# Patient Record
Sex: MALE | Race: OTHER | Employment: UNEMPLOYED | URBAN - METROPOLITAN AREA
[De-identification: names, ages, dates, MRNs, and addresses within clinical notes are randomized per-mention and may not be internally consistent; named-entity substitution may affect disease eponyms.]

---

## 2024-08-22 ENCOUNTER — HOSPITAL ENCOUNTER (EMERGENCY)
Facility: HOSPITAL | Age: 34
Discharge: HOME | End: 2024-08-22
Attending: EMERGENCY MEDICINE

## 2024-08-22 VITALS
HEART RATE: 64 BPM | WEIGHT: 185.19 LBS | DIASTOLIC BLOOD PRESSURE: 73 MMHG | SYSTOLIC BLOOD PRESSURE: 131 MMHG | OXYGEN SATURATION: 99 % | BODY MASS INDEX: 31.79 KG/M2 | TEMPERATURE: 98.3 F | RESPIRATION RATE: 16 BRPM

## 2024-08-22 DIAGNOSIS — J45.21 MILD INTERMITTENT ASTHMA WITH EXACERBATION: Primary | ICD-10-CM

## 2024-08-22 DIAGNOSIS — J45.909 ASTHMA: ICD-10-CM

## 2024-08-22 PROCEDURE — 99283 EMERGENCY DEPT VISIT LOW MDM: CPT

## 2024-08-22 PROCEDURE — 99284 EMERGENCY DEPT VISIT MOD MDM: CPT | Performed by: EMERGENCY MEDICINE

## 2024-08-22 RX ORDER — ALBUTEROL SULFATE 90 UG/1
2 AEROSOL, METERED RESPIRATORY (INHALATION) ONCE
Status: COMPLETED | OUTPATIENT
Start: 2024-08-22 | End: 2024-08-22

## 2024-08-22 RX ORDER — ALBUTEROL SULFATE 90 UG/1
2 AEROSOL, METERED RESPIRATORY (INHALATION) EVERY 6 HOURS PRN
Qty: 8.5 G | Refills: 0 | Status: SHIPPED | OUTPATIENT
Start: 2024-08-22

## 2024-08-22 RX ADMIN — ALBUTEROL SULFATE 2 PUFF: 90 AEROSOL, METERED RESPIRATORY (INHALATION) at 10:56

## 2024-08-22 NOTE — ED PROVIDER NOTES
History  Chief Complaint   Patient presents with    Asthma     Ran out of albuterol inhaler this AM and feels tight.     34-year-old male presenting to the emerged department with wheezing and chest tightness.  No chest pain.  No fever chills.  No cough congestion rhinorrhea.        Prior to Admission Medications   Prescriptions Last Dose Informant Patient Reported? Taking?   LORazepam (Ativan) 1 mg tablet   No No   Sig: Take 1 tablet (1 mg total) by mouth every 6 (six) hours as needed for anxiety   albuterol (2.5 mg/3 mL) 0.083 % nebulizer solution   No No   Sig: Take 3 mL (2.5 mg total) by nebulization every 6 (six) hours as needed for wheezing or shortness of breath   albuterol (2.5 mg/3 mL) 0.083 % nebulizer solution   No No   Sig: Take 3 mL (2.5 mg total) by nebulization every 6 (six) hours as needed for wheezing or shortness of breath   albuterol (PROVENTIL HFA,VENTOLIN HFA) 90 mcg/act inhaler   No No   Sig: Inhale 2 puffs every 4 (four) hours as needed for wheezing or shortness of breath   albuterol (ProAir HFA) 90 mcg/act inhaler   No No   Sig: Inhale 2 puffs every 6 (six) hours as needed for wheezing   albuterol (ProAir HFA) 90 mcg/act inhaler   No No   Sig: Inhale 2 puffs every 6 (six) hours as needed for wheezing   albuterol (ProAir HFA) 90 mcg/act inhaler   No Yes   Sig: Inhale 2 puffs every 6 (six) hours as needed for wheezing   albuterol (Ventolin HFA) 90 mcg/act inhaler   No No   Sig: Inhale 1-2 puffs every 6 (six) hours as needed for wheezing   benzonatate (TESSALON PERLES) 100 mg capsule   No No   Sig: Take 1 capsule (100 mg total) by mouth 3 (three) times a day as needed for cough   budesonide-formoterol (Symbicort) 160-4.5 mcg/act inhaler   No No   Sig: Inhale 2 puffs 2 (two) times a day Rinse mouth after use.   Patient not taking: Reported on 4/3/2024   guaiFENesin (ROBITUSSIN) 100 MG/5ML oral liquid   No No   Sig: Take 5-10 mL (100-200 mg total) by mouth every 4 (four) hours as needed for cough    hydrOXYzine HCL (ATARAX) 25 mg tablet   No No   Sig: Take 1 tablet (25 mg total) by mouth every 6 (six) hours as needed for itching or anxiety   Patient not taking: Reported on 9/2/2023   pantoprazole (PROTONIX) 40 mg tablet   No No   Sig: Take 1 tablet (40 mg total) by mouth daily   Patient not taking: Reported on 4/3/2024   predniSONE 20 mg tablet   No No   Sig: Take 3 tablets (60 mg total) by mouth daily   sertraline (ZOLOFT) 50 mg tablet   No No   Sig: Take 1 tablet (50 mg total) by mouth daily   Patient not taking: Reported on 9/2/2023      Facility-Administered Medications Last Administration Doses Remaining   ipratropium (ATROVENT) 0.02 % inhalation solution 0.5 mg 9/14/2021  7:29 PM           Past Medical History:   Diagnosis Date    Anxiety     Asthma        Past Surgical History:   Procedure Laterality Date    APPENDECTOMY      FACIAL RECONSTRUCTION SURGERY      HERNIA REPAIR         History reviewed. No pertinent family history.  I have reviewed and agree with the history as documented.    E-Cigarette/Vaping    E-Cigarette Use Never User      E-Cigarette/Vaping Substances    Nicotine No     THC No     CBD No     Flavoring No     Other No     Unknown No      Social History     Tobacco Use    Smoking status: Former     Passive exposure: Never    Smokeless tobacco: Never   Vaping Use    Vaping status: Never Used   Substance Use Topics    Alcohol use: Yes     Comment: occ    Drug use: Yes     Types: Marijuana       Review of Systems   Constitutional:  Negative for chills and fever.   HENT:  Negative for ear pain and sore throat.    Eyes:  Negative for pain and visual disturbance.   Respiratory:  Positive for chest tightness and wheezing. Negative for cough and shortness of breath.    Cardiovascular:  Negative for chest pain and palpitations.   Gastrointestinal:  Negative for abdominal pain and vomiting.   Genitourinary:  Negative for dysuria and hematuria.   Musculoskeletal:  Negative for arthralgias and  back pain.   Skin:  Negative for color change and rash.   Neurological:  Negative for seizures and syncope.   All other systems reviewed and are negative.      Physical Exam  Physical Exam  Vitals and nursing note reviewed.   Constitutional:       General: He is not in acute distress.     Appearance: He is well-developed.   HENT:      Head: Normocephalic and atraumatic.   Eyes:      Conjunctiva/sclera: Conjunctivae normal.   Cardiovascular:      Rate and Rhythm: Normal rate and regular rhythm.      Heart sounds: No murmur heard.  Pulmonary:      Effort: Pulmonary effort is normal. No respiratory distress.      Breath sounds: Wheezing present.   Abdominal:      Palpations: Abdomen is soft.      Tenderness: There is no abdominal tenderness.   Musculoskeletal:         General: No swelling.      Cervical back: Neck supple.   Skin:     General: Skin is warm and dry.      Capillary Refill: Capillary refill takes less than 2 seconds.   Neurological:      Mental Status: He is alert.   Psychiatric:         Mood and Affect: Mood normal.         Vital Signs  ED Triage Vitals [08/22/24 1033]   Temperature Pulse Respirations Blood Pressure SpO2   98.3 °F (36.8 °C) 64 16 131/73 99 %      Temp Source Heart Rate Source Patient Position - Orthostatic VS BP Location FiO2 (%)   Oral Monitor Sitting Left arm --      Pain Score       --           Vitals:    08/22/24 1033   BP: 131/73   Pulse: 64   Patient Position - Orthostatic VS: Sitting         Visual Acuity      ED Medications  Medications   albuterol (PROVENTIL HFA,VENTOLIN HFA) inhaler 2 puff (2 puffs Inhalation Given 8/22/24 1056)       Diagnostic Studies  Results Reviewed       None                   No orders to display              Procedures  Procedures         ED Course                                 SBIRT 20yo+      Flowsheet Row Most Recent Value   Initial Alcohol Screen: US AUDIT-C     1. How often do you have a drink containing alcohol? 0 Filed at: 08/22/2024 1037   2.  How many drinks containing alcohol do you have on a typical day you are drinking?  0 Filed at: 08/22/2024 1037   3a. Male UNDER 65: How often do you have five or more drinks on one occasion? 0 Filed at: 08/22/2024 1037   3b. FEMALE Any Age, or MALE 65+: How often do you have 4 or more drinks on one occassion? 0 Filed at: 08/22/2024 1037   Audit-C Score 0 Filed at: 08/22/2024 1037   JEREMIAH: How many times in the past year have you...    Used an illegal drug or used a prescription medication for non-medical reasons? Never Filed at: 08/22/2024 1037                      Medical Decision Making  34-year-old male with asthma, here with wheezing. Resolved with albuterol.     Risk  Prescription drug management.                 Disposition  Final diagnoses:   Mild intermittent asthma with exacerbation     Time reflects when diagnosis was documented in both MDM as applicable and the Disposition within this note       Time User Action Codes Description Comment    8/22/2024 10:59 AM Katie Montoya [J45.21] Mild intermittent asthma with exacerbation     8/22/2024 10:59 AM Katie Montoya [J45.909] Asthma           ED Disposition       ED Disposition   Discharge    Condition   Stable    Date/Time   Thu Aug 22, 2024 1059    Comment   Dileep Luis discharge to home/self care.                   Follow-up Information       Follow up With Specialties Details Why Contact Info    Sotero Domingo MD Family Medicine   [address] [place] [address]  [phone]              Discharge Medication List as of 8/22/2024 10:59 AM        CONTINUE these medications which have CHANGED    Details   !! albuterol (ProAir HFA) 90 mcg/act inhaler Inhale 2 puffs every 6 (six) hours as needed for wheezing, Starting Thu 8/22/2024, Normal       !! - Potential duplicate medications found. Please discuss with provider.        CONTINUE these medications which have NOT CHANGED    Details   !! albuterol (2.5 mg/3 mL)  0.083 % nebulizer solution Take 3 mL (2.5 mg total) by nebulization every 6 (six) hours as needed for wheezing or shortness of breath, Starting Tue 2/6/2024, Normal      !! albuterol (2.5 mg/3 mL) 0.083 % nebulizer solution Take 3 mL (2.5 mg total) by nebulization every 6 (six) hours as needed for wheezing or shortness of breath, Starting Fri 7/5/2024, Normal      !! albuterol (ProAir HFA) 90 mcg/act inhaler Inhale 2 puffs every 6 (six) hours as needed for wheezing, Starting Fri 7/5/2024, Normal      !! albuterol (PROVENTIL HFA,VENTOLIN HFA) 90 mcg/act inhaler Inhale 2 puffs every 4 (four) hours as needed for wheezing or shortness of breath, Starting Tue 6/11/2024, Until Wed 6/11/2025 at 2359, Normal      !! albuterol (Ventolin HFA) 90 mcg/act inhaler Inhale 1-2 puffs every 6 (six) hours as needed for wheezing, Starting Wed 4/3/2024, Normal      benzonatate (TESSALON PERLES) 100 mg capsule Take 1 capsule (100 mg total) by mouth 3 (three) times a day as needed for cough, Starting Wed 1/3/2024, Normal      budesonide-formoterol (Symbicort) 160-4.5 mcg/act inhaler Inhale 2 puffs 2 (two) times a day Rinse mouth after use., Starting Tue 2/6/2024, Normal      guaiFENesin (ROBITUSSIN) 100 MG/5ML oral liquid Take 5-10 mL (100-200 mg total) by mouth every 4 (four) hours as needed for cough, Starting Wed 1/3/2024, Normal      hydrOXYzine HCL (ATARAX) 25 mg tablet Take 1 tablet (25 mg total) by mouth every 6 (six) hours as needed for itching or anxiety, Starting Wed 6/7/2023, Normal      LORazepam (Ativan) 1 mg tablet Take 1 tablet (1 mg total) by mouth every 6 (six) hours as needed for anxiety, Starting Tue 9/19/2023, Normal      pantoprazole (PROTONIX) 40 mg tablet Take 1 tablet (40 mg total) by mouth daily, Starting Tue 12/5/2023, Normal      predniSONE 20 mg tablet Take 3 tablets (60 mg total) by mouth daily, Starting Wed 4/3/2024, Normal      sertraline (ZOLOFT) 50 mg tablet Take 1 tablet (50 mg total) by mouth daily,  Starting Mon 6/12/2023, Until Sat 12/9/2023, Normal       !! - Potential duplicate medications found. Please discuss with provider.          No discharge procedures on file.    PDMP Review       None            ED Provider  Electronically Signed by             Katie Montoya MD  08/22/24 5132

## 2024-10-04 ENCOUNTER — HOSPITAL ENCOUNTER (EMERGENCY)
Facility: HOSPITAL | Age: 34
Discharge: HOME | End: 2024-10-04
Attending: EMERGENCY MEDICINE

## 2024-10-04 VITALS
BODY MASS INDEX: 31.1 KG/M2 | WEIGHT: 181.2 LBS | RESPIRATION RATE: 20 BRPM | TEMPERATURE: 97.7 F | SYSTOLIC BLOOD PRESSURE: 142 MMHG | OXYGEN SATURATION: 100 % | DIASTOLIC BLOOD PRESSURE: 74 MMHG | HEART RATE: 80 BPM

## 2024-10-04 DIAGNOSIS — Z76.0 MEDICATION REFILL: Primary | ICD-10-CM

## 2024-10-04 DIAGNOSIS — Z76.0 ENCOUNTER FOR MEDICATION REFILL: ICD-10-CM

## 2024-10-04 DIAGNOSIS — J45.901 ASTHMA EXACERBATION, MILD: ICD-10-CM

## 2024-10-04 PROCEDURE — 99285 EMERGENCY DEPT VISIT HI MDM: CPT | Performed by: EMERGENCY MEDICINE

## 2024-10-04 PROCEDURE — 99281 EMR DPT VST MAYX REQ PHY/QHP: CPT

## 2024-10-04 RX ORDER — ALBUTEROL SULFATE 90 UG/1
2 INHALANT RESPIRATORY (INHALATION) ONCE
Status: COMPLETED | OUTPATIENT
Start: 2024-10-04 | End: 2024-10-04

## 2024-10-04 RX ORDER — ALBUTEROL SULFATE 90 UG/1
2 INHALANT RESPIRATORY (INHALATION) EVERY 6 HOURS PRN
Qty: 8.5 G | Refills: 3 | Status: SHIPPED | OUTPATIENT
Start: 2024-10-04

## 2024-10-04 RX ADMIN — ALBUTEROL SULFATE 2 PUFF: 90 AEROSOL, METERED RESPIRATORY (INHALATION) at 12:10

## 2024-10-04 NOTE — ED PROVIDER NOTES
Final diagnoses:   Medication refill   Asthma exacerbation, mild     ED Disposition       ED Disposition   Discharge    Condition   Stable    Date/Time   Fri Oct 4, 2024 12:02 PM    Comment   Falk Carmelo Sauerkins discharge to home/self care.                   Assessment & Plan       Medical Decision Making    MDM/DDx: Chest tightness/wheezes - asthma flare, bronchitis/bronchospasm, less likely but at risk for pneumonia, pneumothorax, ACS/MI or PE.     I independently reviewed and interpreted ordered labs from this encounter.    A/P: Will treat symptoms with MCKNIGHT neb, Mag and steroids, reevaluate for disposition.    Risk  Prescription drug management.             Medications   albuterol (PROVENTIL HFA,VENTOLIN HFA) inhaler 2 puff (2 puffs Inhalation Given 10/4/24 1210)       ED Risk Strat Scores                                               History of Present Illness       Chief Complaint   Patient presents with    Medication Refill     Reports he is just here for a medication refill on his inhaler - albuterol       Past Medical History:   Diagnosis Date    Anxiety     Asthma       Past Surgical History:   Procedure Laterality Date    APPENDECTOMY      FACIAL RECONSTRUCTION SURGERY      HERNIA REPAIR        History reviewed. No pertinent family history.   Social History     Tobacco Use    Smoking status: Former     Passive exposure: Never    Smokeless tobacco: Never   Vaping Use    Vaping status: Never Used   Substance Use Topics    Alcohol use: Yes     Comment: occ    Drug use: Yes     Types: Marijuana      E-Cigarette/Vaping    E-Cigarette Use Never User       E-Cigarette/Vaping Substances    Nicotine No     THC No     CBD No     Flavoring No     Other No     Unknown No       I have reviewed and agree with the history as documented.     34-year-old male returns to the emergency department requesting a refill of his albuterol MDI.  He states he ran out today because he thought it read 100 when it read 00.  He does  not want to have an exacerbation.  He is seen here frequently for asthma exacerbations and MDI refills, despite having a PCP, but states that he has an appointment with a pulmonologist at the [facility] in the coming weeks.  He reports mild chest tightness and occasional wheezes now but knows that it will worsen as he works today.    Denies inhalation drug use. No recent travel or sick contacts.     Denies f/c, lightheadedness/dizziness, diaphoresis, chest pain other than tightness, abdominal pain, n/v/d. 12 system ROS o/w negative.          History provided by:  Patient and medical records  Medication Refill  Asthma  Severity:  Mild  Onset quality:  Unable to specify  Timing:  Intermittent  Progression:  Waxing and waning  Chronicity:  Chronic  Associated symptoms: wheezing    Associated symptoms: no abdominal pain, no chest pain, no congestion, no cough, no diarrhea, no fever, no headaches, no nausea, no rhinorrhea, no shortness of breath and no vomiting        Review of Systems   Constitutional:  Negative for diaphoresis, fever and unexpected weight change.   HENT:  Negative for congestion, postnasal drip, rhinorrhea and trouble swallowing.    Eyes: Negative.    Respiratory:  Positive for chest tightness and wheezing. Negative for cough and shortness of breath.    Cardiovascular:  Negative for chest pain, palpitations and leg swelling.   Gastrointestinal:  Negative for abdominal pain, diarrhea, nausea and vomiting.   Genitourinary:  Negative for flank pain.   Musculoskeletal:  Negative for neck pain and neck stiffness.   Skin:  Negative for pallor.   Neurological:  Negative for dizziness, light-headedness and headaches.   Psychiatric/Behavioral:  Negative for agitation and confusion.    All other systems reviewed and are negative.          Objective       ED Triage Vitals [10/04/24 1204]   Temperature Pulse Blood Pressure Respirations SpO2 Patient Position - Orthostatic VS   97.7 °F (36.5 °C) 80 142/74 20 100 %  Sitting      Temp Source Heart Rate Source BP Location FiO2 (%) Pain Score    Oral Monitor Right arm -- --      Vitals      Date and Time Temp Pulse SpO2 Resp BP Pain Score FACES Pain Rating User   10/04/24 1204 97.7 °F (36.5 °C) 80 100 % 20 142/74 -- -- ES            Physical Exam  Vitals reviewed.   Constitutional:       General: He is not in acute distress.     Appearance: He is well-developed. He is not toxic-appearing or diaphoretic.   HENT:      Head: Normocephalic and atraumatic.      Right Ear: External ear normal.      Left Ear: External ear normal.      Nose: Nose normal. No congestion or rhinorrhea.      Mouth/Throat:      Mouth: Mucous membranes are moist.      Pharynx: Oropharynx is clear. No oropharyngeal exudate.   Eyes:      General: No scleral icterus.     Conjunctiva/sclera: Conjunctivae normal.      Pupils: Pupils are equal, round, and reactive to light.   Cardiovascular:      Rate and Rhythm: Normal rate and regular rhythm.      Heart sounds: Normal heart sounds. No murmur heard.  Pulmonary:      Effort: Tachypnea and accessory muscle usage present. No respiratory distress.      Breath sounds: Wheezing present.   Chest:      Chest wall: No tenderness.   Abdominal:      General: Bowel sounds are normal.      Palpations: Abdomen is soft.   Musculoskeletal:         General: Normal range of motion.      Cervical back: Normal range of motion and neck supple.   Lymphadenopathy:      Cervical: No cervical adenopathy.   Skin:     General: Skin is warm and dry.      Coloration: Skin is not pale.   Neurological:      General: No focal deficit present.      Mental Status: He is alert and oriented to person, place, and time.      Motor: No abnormal muscle tone.   Psychiatric:         Mood and Affect: Mood normal.         Behavior: Behavior normal.         Thought Content: Thought content normal.         Results Reviewed       None            No orders to display       Procedures    ED Medication and  Procedure Management   Prior to Admission Medications   Prescriptions Last Dose Informant Patient Reported? Taking?   LORazepam (Ativan) 1 mg tablet   No No   Sig: Take 1 tablet (1 mg total) by mouth every 6 (six) hours as needed for anxiety   albuterol (2.5 mg/3 mL) 0.083 % nebulizer solution   No No   Sig: Take 3 mL (2.5 mg total) by nebulization every 6 (six) hours as needed for wheezing or shortness of breath   albuterol (2.5 mg/3 mL) 0.083 % nebulizer solution   No No   Sig: Take 3 mL (2.5 mg total) by nebulization every 6 (six) hours as needed for wheezing or shortness of breath   albuterol (PROVENTIL HFA,VENTOLIN HFA) 90 mcg/act inhaler   No No   Sig: Inhale 2 puffs every 4 (four) hours as needed for wheezing or shortness of breath   albuterol (ProAir HFA) 90 mcg/act inhaler   No No   Sig: Inhale 2 puffs every 6 (six) hours as needed for wheezing   albuterol (ProAir HFA) 90 mcg/act inhaler   No No   Sig: Inhale 2 puffs every 6 (six) hours as needed for wheezing   albuterol (ProAir HFA) 90 mcg/act inhaler   No Yes   Sig: Inhale 2 puffs every 6 (six) hours as needed for wheezing   albuterol (Ventolin HFA) 90 mcg/act inhaler   No No   Sig: Inhale 1-2 puffs every 6 (six) hours as needed for wheezing   benzonatate (TESSALON PERLES) 100 mg capsule   No No   Sig: Take 1 capsule (100 mg total) by mouth 3 (three) times a day as needed for cough   budesonide-formoterol (Symbicort) 160-4.5 mcg/act inhaler   No No   Sig: Inhale 2 puffs 2 (two) times a day Rinse mouth after use.   Patient not taking: Reported on 4/3/2024   guaiFENesin (ROBITUSSIN) 100 MG/5ML oral liquid   No No   Sig: Take 5-10 mL (100-200 mg total) by mouth every 4 (four) hours as needed for cough   hydrOXYzine HCL (ATARAX) 25 mg tablet   No No   Sig: Take 1 tablet (25 mg total) by mouth every 6 (six) hours as needed for itching or anxiety   Patient not taking: Reported on 9/2/2023   pantoprazole (PROTONIX) 40 mg tablet   No No   Sig: Take 1 tablet (40  mg total) by mouth daily   Patient not taking: Reported on 4/3/2024   predniSONE 20 mg tablet   No No   Sig: Take 3 tablets (60 mg total) by mouth daily   sertraline (ZOLOFT) 50 mg tablet   No No   Sig: Take 1 tablet (50 mg total) by mouth daily   Patient not taking: Reported on 9/2/2023      Facility-Administered Medications Last Administration Doses Remaining   ipratropium (ATROVENT) 0.02 % inhalation solution 0.5 mg 9/14/2021  7:29 PM         Current Discharge Medication List        CONTINUE these medications which have CHANGED    Details   !! albuterol (ProAir HFA) 90 mcg/act inhaler Inhale 2 puffs every 6 (six) hours as needed for wheezing  Qty: 8.5 g, Refills: 3    Comments: Substitution to a formulary equivalent within the same pharmaceutical class is authorized.  Associated Diagnoses: Encounter for medication refill       !! - Potential duplicate medications found. Please discuss with provider.        CONTINUE these medications which have NOT CHANGED    Details   !! albuterol (2.5 mg/3 mL) 0.083 % nebulizer solution Take 3 mL (2.5 mg total) by nebulization every 6 (six) hours as needed for wheezing or shortness of breath  Qty: 75 mL, Refills: 0    Associated Diagnoses: Asthma      !! albuterol (2.5 mg/3 mL) 0.083 % nebulizer solution Take 3 mL (2.5 mg total) by nebulization every 6 (six) hours as needed for wheezing or shortness of breath  Qty: 75 mL, Refills: 0    Associated Diagnoses: Encounter for medication refill      !! albuterol (ProAir HFA) 90 mcg/act inhaler Inhale 2 puffs every 6 (six) hours as needed for wheezing  Qty: 8.5 g, Refills: 0    Comments: Substitution to a formulary equivalent within the same pharmaceutical class is authorized.  Associated Diagnoses: Asthma      !! albuterol (PROVENTIL HFA,VENTOLIN HFA) 90 mcg/act inhaler Inhale 2 puffs every 4 (four) hours as needed for wheezing or shortness of breath  Qty: 18 g, Refills: 0    Comments: Substitution to a formulary equivalent within the  same pharmaceutical class is authorized.  Associated Diagnoses: Medication refill      !! albuterol (Ventolin HFA) 90 mcg/act inhaler Inhale 1-2 puffs every 6 (six) hours as needed for wheezing  Qty: 6.7 g, Refills: 0    Comments: Substitution to a formulary equivalent within the same pharmaceutical class is authorized.  Associated Diagnoses: Mild intermittent asthma with exacerbation      benzonatate (TESSALON PERLES) 100 mg capsule Take 1 capsule (100 mg total) by mouth 3 (three) times a day as needed for cough  Qty: 20 capsule, Refills: 0    Associated Diagnoses: Asthma exacerbation      budesonide-formoterol (Symbicort) 160-4.5 mcg/act inhaler Inhale 2 puffs 2 (two) times a day Rinse mouth after use.  Qty: 10.2 g, Refills: 0    Associated Diagnoses: Moderate persistent asthma without complication      guaiFENesin (ROBITUSSIN) 100 MG/5ML oral liquid Take 5-10 mL (100-200 mg total) by mouth every 4 (four) hours as needed for cough  Qty: 60 mL, Refills: 0    Associated Diagnoses: Asthma exacerbation      hydrOXYzine HCL (ATARAX) 25 mg tablet Take 1 tablet (25 mg total) by mouth every 6 (six) hours as needed for itching or anxiety  Qty: 40 tablet, Refills: 0    Associated Diagnoses: Anxiety      LORazepam (Ativan) 1 mg tablet Take 1 tablet (1 mg total) by mouth every 6 (six) hours as needed for anxiety  Qty: 20 tablet, Refills: 0    Associated Diagnoses: Anxiety      pantoprazole (PROTONIX) 40 mg tablet Take 1 tablet (40 mg total) by mouth daily  Qty: 14 tablet, Refills: 0    Associated Diagnoses: GERD (gastroesophageal reflux disease)      predniSONE 20 mg tablet Take 3 tablets (60 mg total) by mouth daily  Qty: 15 tablet, Refills: 0    Associated Diagnoses: Mild intermittent asthma with exacerbation      sertraline (ZOLOFT) 50 mg tablet Take 1 tablet (50 mg total) by mouth daily  Qty: 30 tablet, Refills: 5    Associated Diagnoses: Anxiety and depression       !! - Potential duplicate medications found. Please  discuss with provider.        No discharge procedures on file.  ED SEPSIS DOCUMENTATION   Time reflects when diagnosis was documented in both MDM as applicable and the Disposition within this note       Time User Action Codes Description Comment    10/4/2024 12:02 PM Gary Hill [Z76.0] Medication refill     10/4/2024 12:02 PM Gary Hill [J45.909] Asthma     10/4/2024 12:03 PM Gary Hill [Z76.0] Encounter for medication refill     10/4/2024 12:22 PM Gary Hill [J45.909] Asthma     10/4/2024 12:22 PM Gary Hill [J45.901] Asthma exacerbation     10/4/2024 12:22 PM Gary Hill [J45.901] Asthma exacerbation     10/4/2024 12:22 PM Gray Hill [J45.901] Asthma exacerbation, mild                  Gary Hill DO  10/04/24 1213       Gary Hill DO  10/04/24 1222

## 2024-10-06 ENCOUNTER — APPOINTMENT (EMERGENCY)
Dept: RADIOLOGY | Facility: HOSPITAL | Age: 34
End: 2024-10-06

## 2024-10-06 ENCOUNTER — HOSPITAL ENCOUNTER (EMERGENCY)
Facility: HOSPITAL | Age: 34
Discharge: HOME | End: 2024-10-06
Attending: EMERGENCY MEDICINE

## 2024-10-06 VITALS
WEIGHT: 182.7 LBS | RESPIRATION RATE: 26 BRPM | OXYGEN SATURATION: 95 % | TEMPERATURE: 98.9 F | HEART RATE: 110 BPM | DIASTOLIC BLOOD PRESSURE: 108 MMHG | SYSTOLIC BLOOD PRESSURE: 122 MMHG | BODY MASS INDEX: 31.36 KG/M2

## 2024-10-06 DIAGNOSIS — J45.40 MODERATE PERSISTENT ASTHMA WITHOUT COMPLICATION: Primary | ICD-10-CM

## 2024-10-06 LAB
FLUAV AG UPPER RESP QL IA.RAPID: NEGATIVE
FLUBV AG UPPER RESP QL IA.RAPID: NEGATIVE
SARS-COV+SARS-COV-2 AG RESP QL IA.RAPID: NEGATIVE

## 2024-10-06 PROCEDURE — 87804 INFLUENZA ASSAY W/OPTIC: CPT | Performed by: EMERGENCY MEDICINE

## 2024-10-06 PROCEDURE — 94640 AIRWAY INHALATION TREATMENT: CPT

## 2024-10-06 PROCEDURE — 99285 EMERGENCY DEPT VISIT HI MDM: CPT

## 2024-10-06 PROCEDURE — 99284 EMERGENCY DEPT VISIT MOD MDM: CPT | Performed by: EMERGENCY MEDICINE

## 2024-10-06 PROCEDURE — 71045 X-RAY EXAM CHEST 1 VIEW: CPT

## 2024-10-06 PROCEDURE — 87811 SARS-COV-2 COVID19 W/OPTIC: CPT | Performed by: EMERGENCY MEDICINE

## 2024-10-06 RX ORDER — ACETAMINOPHEN 325 MG/1
975 TABLET ORAL ONCE
Status: COMPLETED | OUTPATIENT
Start: 2024-10-06 | End: 2024-10-06

## 2024-10-06 RX ORDER — BENZONATATE 100 MG/1
100 CAPSULE ORAL ONCE
Status: COMPLETED | OUTPATIENT
Start: 2024-10-06 | End: 2024-10-06

## 2024-10-06 RX ORDER — ALBUTEROL SULFATE 90 UG/1
2 INHALANT RESPIRATORY (INHALATION) ONCE
Status: COMPLETED | OUTPATIENT
Start: 2024-10-06 | End: 2024-10-06

## 2024-10-06 RX ORDER — ALBUTEROL SULFATE 5 MG/ML
10 SOLUTION RESPIRATORY (INHALATION) ONCE
Status: DISCONTINUED | OUTPATIENT
Start: 2024-10-06 | End: 2024-10-06

## 2024-10-06 RX ORDER — SODIUM CHLORIDE FOR INHALATION 0.9 %
12 VIAL, NEBULIZER (ML) INHALATION ONCE
Status: DISCONTINUED | OUTPATIENT
Start: 2024-10-06 | End: 2024-10-06

## 2024-10-06 RX ORDER — DEXAMETHASONE 4 MG/1
12 TABLET ORAL ONCE
Qty: 3 TABLET | Refills: 0 | Status: SHIPPED | OUTPATIENT
Start: 2024-10-09 | End: 2024-10-09

## 2024-10-06 RX ORDER — IPRATROPIUM BROMIDE AND ALBUTEROL SULFATE 2.5; .5 MG/3ML; MG/3ML
3 SOLUTION RESPIRATORY (INHALATION) ONCE
Status: COMPLETED | OUTPATIENT
Start: 2024-10-06 | End: 2024-10-06

## 2024-10-06 RX ORDER — BENZONATATE 100 MG/1
100 CAPSULE ORAL EVERY 8 HOURS
Qty: 21 CAPSULE | Refills: 0 | Status: SHIPPED | OUTPATIENT
Start: 2024-10-06

## 2024-10-06 RX ADMIN — BENZONATATE 100 MG: 100 CAPSULE ORAL at 21:45

## 2024-10-06 RX ADMIN — IPRATROPIUM BROMIDE AND ALBUTEROL SULFATE 3 ML: 2.5; .5 SOLUTION RESPIRATORY (INHALATION) at 20:28

## 2024-10-06 RX ADMIN — IPRATROPIUM BROMIDE AND ALBUTEROL SULFATE 3 ML: 2.5; .5 SOLUTION RESPIRATORY (INHALATION) at 20:27

## 2024-10-06 RX ADMIN — ALBUTEROL SULFATE 2 PUFF: 90 AEROSOL, METERED RESPIRATORY (INHALATION) at 21:45

## 2024-10-06 RX ADMIN — DEXAMETHASONE SODIUM PHOSPHATE 10 MG: 10 INJECTION, SOLUTION INTRAMUSCULAR; INTRAVENOUS at 20:28

## 2024-10-06 RX ADMIN — ACETAMINOPHEN 975 MG: 325 TABLET ORAL at 20:28

## 2024-10-06 NOTE — Clinical Note
Dileep Luis was seen and treated in our emergency department on 10/6/2024.                Diagnosis:     Dileep  .    He may return on this date: 10/09/2024         If you have any questions or concerns, please don't hesitate to call.      Portillo Arreguin, DO    ______________________________           _______________          _______________  Hospital Representative                              Date                                Time

## 2024-10-07 ENCOUNTER — HOSPITAL ENCOUNTER (EMERGENCY)
Facility: HOSPITAL | Age: 34
Discharge: HOME | End: 2024-10-07
Attending: EMERGENCY MEDICINE

## 2024-10-07 VITALS
SYSTOLIC BLOOD PRESSURE: 147 MMHG | OXYGEN SATURATION: 95 % | DIASTOLIC BLOOD PRESSURE: 72 MMHG | RESPIRATION RATE: 18 BRPM | HEART RATE: 106 BPM | TEMPERATURE: 98.5 F

## 2024-10-07 DIAGNOSIS — B34.9 VIRAL SYNDROME: ICD-10-CM

## 2024-10-07 DIAGNOSIS — J45.901 ASTHMA EXACERBATION: Primary | ICD-10-CM

## 2024-10-07 LAB
ALBUMIN SERPL BCG-MCNC: 5.1 G/DL (ref 3.5–5)
ALP SERPL-CCNC: 67 U/L (ref 34–104)
ALT SERPL W P-5'-P-CCNC: 19 U/L (ref 7–52)
ANION GAP SERPL CALCULATED.3IONS-SCNC: 18 MMOL/L (ref 4–13)
AST SERPL W P-5'-P-CCNC: 20 U/L (ref 13–39)
ATRIAL RATE: 110 BPM
BASE EX.OXY STD BLDV CALC-SCNC: 94.1 % (ref 60–80)
BASE EXCESS BLDV CALC-SCNC: -5.3 MMOL/L
BASOPHILS # BLD MANUAL: 0 THOUSAND/UL (ref 0–0.1)
BASOPHILS NFR MAR MANUAL: 0 % (ref 0–1)
BILIRUB SERPL-MCNC: 0.77 MG/DL (ref 0.2–1)
BUN SERPL-MCNC: 13 MG/DL (ref 5–25)
CALCIUM SERPL-MCNC: 10.2 MG/DL (ref 8.4–10.2)
CHLORIDE SERPL-SCNC: 105 MMOL/L (ref 96–108)
CO2 SERPL-SCNC: 18 MMOL/L (ref 21–32)
CREAT SERPL-MCNC: 1.14 MG/DL (ref 0.6–1.3)
EOSINOPHIL # BLD MANUAL: 0 THOUSAND/UL (ref 0–0.4)
EOSINOPHIL NFR BLD MANUAL: 0 % (ref 0–6)
ERYTHROCYTE [DISTWIDTH] IN BLOOD BY AUTOMATED COUNT: 12.2 % (ref 11.6–15.1)
GFR SERPL CREATININE-BSD FRML MDRD: 84 ML/MIN/1.73SQ M
GLUCOSE SERPL-MCNC: 136 MG/DL (ref 65–140)
HCO3 BLDV-SCNC: 16.7 MMOL/L (ref 24–30)
HCT VFR BLD AUTO: 47.8 % (ref 36.5–49.3)
HGB BLD-MCNC: 16.3 G/DL (ref 12–17)
LYMPHOCYTES # BLD AUTO: 0.59 THOUSAND/UL (ref 0.6–4.47)
LYMPHOCYTES # BLD AUTO: 4 % (ref 14–44)
MCH RBC QN AUTO: 29.9 PG (ref 26.8–34.3)
MCHC RBC AUTO-ENTMCNC: 34.1 G/DL (ref 31.4–37.4)
MCV RBC AUTO: 88 FL (ref 82–98)
MONOCYTES # BLD AUTO: 0.15 THOUSAND/UL (ref 0–1.22)
MONOCYTES NFR BLD: 1 % (ref 4–12)
MYELOCYTE ABSOLUTE CT: 0.15 THOUSAND/UL (ref 0–0.1)
MYELOCYTES NFR BLD MANUAL: 1 % (ref 0–1)
NEUTROPHILS # BLD MANUAL: 13.77 THOUSAND/UL (ref 1.85–7.62)
NEUTS SEG NFR BLD AUTO: 94 % (ref 43–75)
O2 CT BLDV-SCNC: 23.3 ML/DL
P AXIS: 73 DEGREES
P AXIS: 74 DEGREES
P AXIS: 76 DEGREES
PCO2 BLDV: 25.7 MM HG (ref 42–50)
PH BLDV: 7.43 [PH] (ref 7.3–7.4)
PLATELET # BLD AUTO: 322 THOUSANDS/UL (ref 149–390)
PLATELET BLD QL SMEAR: ADEQUATE
PMV BLD AUTO: 10.1 FL (ref 8.9–12.7)
PO2 BLDV: 79 MM HG (ref 35–45)
POTASSIUM SERPL-SCNC: 4.1 MMOL/L (ref 3.5–5.3)
PR INTERVAL: 124 MS
PR INTERVAL: 128 MS
PR INTERVAL: 132 MS
PROT SERPL-MCNC: 8.1 G/DL (ref 6.4–8.4)
QRS AXIS: 67 DEGREES
QRS AXIS: 74 DEGREES
QRS AXIS: 75 DEGREES
QRSD INTERVAL: 74 MS
QRSD INTERVAL: 76 MS
QRSD INTERVAL: 78 MS
QT INTERVAL: 332 MS
QT INTERVAL: 334 MS
QT INTERVAL: 338 MS
QTC INTERVAL: 449 MS
QTC INTERVAL: 452 MS
QTC INTERVAL: 457 MS
RBC # BLD AUTO: 5.46 MILLION/UL (ref 3.88–5.62)
RBC MORPH BLD: NORMAL
SODIUM SERPL-SCNC: 141 MMOL/L (ref 135–147)
T WAVE AXIS: 24 DEGREES
T WAVE AXIS: 27 DEGREES
T WAVE AXIS: 29 DEGREES
VENTRICULAR RATE: 110 BPM
WBC # BLD AUTO: 14.65 THOUSAND/UL (ref 4.31–10.16)

## 2024-10-07 PROCEDURE — 96375 TX/PRO/DX INJ NEW DRUG ADDON: CPT

## 2024-10-07 PROCEDURE — 85027 COMPLETE CBC AUTOMATED: CPT

## 2024-10-07 PROCEDURE — 94640 AIRWAY INHALATION TREATMENT: CPT

## 2024-10-07 PROCEDURE — 96365 THER/PROPH/DIAG IV INF INIT: CPT

## 2024-10-07 PROCEDURE — 99285 EMERGENCY DEPT VISIT HI MDM: CPT

## 2024-10-07 PROCEDURE — 93010 ELECTROCARDIOGRAM REPORT: CPT | Performed by: STUDENT IN AN ORGANIZED HEALTH CARE EDUCATION/TRAINING PROGRAM

## 2024-10-07 PROCEDURE — 94644 CONT INHLJ TX 1ST HOUR: CPT

## 2024-10-07 PROCEDURE — 82805 BLOOD GASES W/O2 SATURATION: CPT

## 2024-10-07 PROCEDURE — 80053 COMPREHEN METABOLIC PANEL: CPT

## 2024-10-07 PROCEDURE — 93005 ELECTROCARDIOGRAM TRACING: CPT

## 2024-10-07 PROCEDURE — 85007 BL SMEAR W/DIFF WBC COUNT: CPT

## 2024-10-07 PROCEDURE — 94664 DEMO&/EVAL PT USE INHALER: CPT

## 2024-10-07 PROCEDURE — 36415 COLL VENOUS BLD VENIPUNCTURE: CPT

## 2024-10-07 RX ORDER — MAGNESIUM SULFATE HEPTAHYDRATE 40 MG/ML
2 INJECTION, SOLUTION INTRAVENOUS ONCE
Status: COMPLETED | OUTPATIENT
Start: 2024-10-07 | End: 2024-10-07

## 2024-10-07 RX ORDER — BENZONATATE 100 MG/1
200 CAPSULE ORAL ONCE
Status: COMPLETED | OUTPATIENT
Start: 2024-10-07 | End: 2024-10-07

## 2024-10-07 RX ORDER — METHYLPREDNISOLONE SODIUM SUCCINATE 125 MG/2ML
80 INJECTION, POWDER, LYOPHILIZED, FOR SOLUTION INTRAMUSCULAR; INTRAVENOUS ONCE
Status: COMPLETED | OUTPATIENT
Start: 2024-10-07 | End: 2024-10-07

## 2024-10-07 RX ORDER — KETOROLAC TROMETHAMINE 30 MG/ML
15 INJECTION, SOLUTION INTRAMUSCULAR; INTRAVENOUS ONCE
Status: COMPLETED | OUTPATIENT
Start: 2024-10-07 | End: 2024-10-07

## 2024-10-07 RX ORDER — KETOROLAC TROMETHAMINE 30 MG/ML
15 INJECTION, SOLUTION INTRAMUSCULAR; INTRAVENOUS ONCE
Status: DISCONTINUED | OUTPATIENT
Start: 2024-10-07 | End: 2024-10-07

## 2024-10-07 RX ORDER — IPRATROPIUM BROMIDE AND ALBUTEROL SULFATE 2.5; .5 MG/3ML; MG/3ML
3 SOLUTION RESPIRATORY (INHALATION) EVERY 6 HOURS PRN
Qty: 90 ML | Refills: 0 | Status: SHIPPED | OUTPATIENT
Start: 2024-10-07

## 2024-10-07 RX ORDER — SODIUM CHLORIDE FOR INHALATION 0.9 %
12 VIAL, NEBULIZER (ML) INHALATION ONCE
Status: COMPLETED | OUTPATIENT
Start: 2024-10-07 | End: 2024-10-07

## 2024-10-07 RX ORDER — METHYLPREDNISOLONE SODIUM SUCCINATE 125 MG/2ML
125 INJECTION, POWDER, LYOPHILIZED, FOR SOLUTION INTRAMUSCULAR; INTRAVENOUS ONCE
Status: DISCONTINUED | OUTPATIENT
Start: 2024-10-07 | End: 2024-10-07

## 2024-10-07 RX ORDER — ALBUTEROL SULFATE 5 MG/ML
10 SOLUTION RESPIRATORY (INHALATION) ONCE
Status: COMPLETED | OUTPATIENT
Start: 2024-10-07 | End: 2024-10-07

## 2024-10-07 RX ORDER — ALBUTEROL SULFATE 90 UG/1
2 INHALANT RESPIRATORY (INHALATION) EVERY 6 HOURS PRN
Qty: 8.5 G | Refills: 0 | Status: SHIPPED | OUTPATIENT
Start: 2024-10-07

## 2024-10-07 RX ADMIN — BENZONATATE 200 MG: 100 CAPSULE ORAL at 05:12

## 2024-10-07 RX ADMIN — ISODIUM CHLORIDE 12 ML: 0.03 SOLUTION RESPIRATORY (INHALATION) at 04:28

## 2024-10-07 RX ADMIN — KETOROLAC TROMETHAMINE 15 MG: 30 INJECTION, SOLUTION INTRAMUSCULAR; INTRAVENOUS at 05:31

## 2024-10-07 RX ADMIN — IPRATROPIUM BROMIDE 1 MG: 0.5 SOLUTION RESPIRATORY (INHALATION) at 04:28

## 2024-10-07 RX ADMIN — METHYLPREDNISOLONE SODIUM SUCCINATE 80 MG: 125 INJECTION, POWDER, FOR SOLUTION INTRAMUSCULAR; INTRAVENOUS at 04:31

## 2024-10-07 RX ADMIN — ALBUTEROL SULFATE 10 MG: 2.5 SOLUTION RESPIRATORY (INHALATION) at 04:28

## 2024-10-07 RX ADMIN — MAGNESIUM SULFATE IN WATER 2 G: 40 INJECTION, SOLUTION INTRAVENOUS at 04:25

## 2024-10-07 NOTE — ED PROVIDER NOTES
Final diagnoses:   Moderate persistent asthma without complication     ED Disposition       ED Disposition   Discharge    Condition   Stable    Date/Time   Sun Oct 6, 2024  9:40 PM    Comment   Falk Carmelo Sauerkins discharge to home/self care.                   Assessment & Plan       Medical Decision Making     Reviewed past medical records: Yes, multiple previous visits for asthma exacerbations,     History Provided by: The patient     Differential considered: Pneumonia, viral syndrome, asthma exacerbation.     Consideration of tests: Patient's viral testing was negative, chest x-ray without acute abnormality.  Patient feeling better after steroids and breathing treatments.  Again requesting inhaler to be discharged with as he cannot afford his albuterol prescription.  States he gets paid in the few days and will be able to go  a larger inhaler so he can treat himself as an outpatient.  Will return to ED with any worsening symptoms.       I have reviewed the patient's visit and any testing done in the emergency department.  They have verbalized their understanding of any testing done today and have no further questions or concerns regarding their care in the emergency room.  They will follow up with their primary care physician as well as with any specialist in their discharge instructions.  Strict return precautions were discussed.    Amount and/or Complexity of Data Reviewed  Labs: ordered.  Radiology: ordered and independent interpretation performed.    Risk  OTC drugs.  Prescription drug management.             Medications   dexamethasone oral liquid 10 mg 1 mL (10 mg Oral Given 10/6/24 2028)   acetaminophen (TYLENOL) tablet 975 mg (975 mg Oral Given 10/6/24 2028)   ipratropium-albuterol (DUO-NEB) 0.5-2.5 mg/3 mL inhalation solution 3 mL (3 mL Nebulization Given 10/6/24 2028)   ipratropium-albuterol (DUO-NEB) 0.5-2.5 mg/3 mL inhalation solution 3 mL (3 mL Nebulization Given 10/6/24 2027)   benzonatate  "(LIYAH REHMAN) capsule 100 mg (100 mg Oral Given 10/6/24 2145)   albuterol (PROVENTIL HFA,VENTOLIN HFA) inhaler 2 puff (2 puffs Inhalation Given 10/6/24 2145)       ED Risk Strat Scores                           SBIRT 20yo+      Flowsheet Row Most Recent Value   Initial Alcohol Screen: US AUDIT-C     1. How often do you have a drink containing alcohol? 0 Filed at: 10/06/2024 2020   2. How many drinks containing alcohol do you have on a typical day you are drinking?  0 Filed at: 10/06/2024 2020   3b. FEMALE Any Age, or MALE 65+: How often do you have 4 or more drinks on one occassion? 0 Filed at: 10/06/2024 2020   Audit-C Score 0 Filed at: 10/06/2024 2020   JEREMIAH: How many times in the past year have you...    Used an illegal drug or used a prescription medication for non-medical reasons? Never Filed at: 10/06/2024 2020                            History of Present Illness       Chief Complaint   Patient presents with    Shortness of Breath     Arrives reporting increased SOB x2 days; reports worse than normal asthma exacerbations. Low grade fever at home; someone around him sick with COVID. Feels \"crackles\" in lungs when breathing.        Past Medical History:   Diagnosis Date    Anxiety     Asthma       Past Surgical History:   Procedure Laterality Date    APPENDECTOMY      FACIAL RECONSTRUCTION SURGERY      HERNIA REPAIR        History reviewed. No pertinent family history.   Social History     Tobacco Use    Smoking status: Former     Passive exposure: Never    Smokeless tobacco: Never   Vaping Use    Vaping status: Never Used   Substance Use Topics    Alcohol use: Yes     Comment: occ    Drug use: Yes     Types: Marijuana      E-Cigarette/Vaping    E-Cigarette Use Never User       E-Cigarette/Vaping Substances    Nicotine No     THC No     CBD No     Flavoring No     Other No     Unknown No       I have reviewed and agree with the history as documented.     HPI    Review of Systems        Objective       ED " Triage Vitals [10/06/24 2019]   Temperature Pulse Blood Pressure Respirations SpO2 Patient Position - Orthostatic VS   98.9 °F (37.2 °C) (!) 110 (!) 122/108 (!) 26 95 % Sitting      Temp Source Heart Rate Source BP Location FiO2 (%) Pain Score    Oral Monitor Left arm -- --      Vitals      Date and Time Temp Pulse SpO2 Resp BP Pain Score FACES Pain Rating User   10/06/24 2019 98.9 °F (37.2 °C) 110 95 % 26 122/108 -- -- RG            Physical Exam    Results Reviewed       Procedure Component Value Units Date/Time    FLU/COVID Rapid Antigen (30 min. TAT) - Preferred screening test in ED [[id number]]  (Normal) Collected: 10/06/24 2025    Lab Status: Final result Specimen: Nares from Nose Updated: 10/06/24 2050     SARS COV Rapid Antigen Negative     Influenza A Rapid Antigen Negative     Influenza B Rapid Antigen Negative    Narrative:      This test has been performed using the [employer] 2 FLU+SARS Antigen test under the Emergency Use Authorization (EUA). This test has been validated by the  and verified by the performing laboratory. The Shazia uses lateral flow immunofluorescent sandwich assay to detect SARS-COV, Influenza A and Influenza B Antigen.     The [employer] Sh[employer]ia 2 SARS Antigen test does not differentiate between SARS-CoV and SARS-CoV-2.     Negative results are presumptive and may be confirmed with a molecular assay, if necessary, for patient management. Negative results do not rule out SARS-CoV-2 or influenza infection and should not be used as the sole basis for treatment or patient management decisions. A negative test result may occur if the level of antigen in a sample is below the limit of detection of this test.     Positive results are indicative of the presence of viral antigens, but do not rule out bacterial infection or co-infection with other viruses.     All test results should be used as an adjunct to clinical observations and other information available to the provider.    FOR  PEDIATRIC PATIENTS - copy/paste COVID Guidelines URL to browser: [email or web address]            XR chest 1 view portable   ED Interpretation by Portillo Arreguin DO (10/06 2105)   No acute pna or ptx appreciated.      Final Interpretation by Macy Latif MD (10/07 0745)      No acute cardiopulmonary disease.            Workstation performed: [id number]             Procedures    ED Medication and Procedure Management   Prior to Admission Medications   Prescriptions Last Dose Informant Patient Reported? Taking?   LORazepam (Ativan) 1 mg tablet   No No   Sig: Take 1 tablet (1 mg total) by mouth every 6 (six) hours as needed for anxiety   albuterol (2.5 mg/3 mL) 0.083 % nebulizer solution   No No   Sig: Take 3 mL (2.5 mg total) by nebulization every 6 (six) hours as needed for wheezing or shortness of breath   albuterol (2.5 mg/3 mL) 0.083 % nebulizer solution   No No   Sig: Take 3 mL (2.5 mg total) by nebulization every 6 (six) hours as needed for wheezing or shortness of breath   albuterol (PROVENTIL HFA,VENTOLIN HFA) 90 mcg/act inhaler   No No   Sig: Inhale 2 puffs every 4 (four) hours as needed for wheezing or shortness of breath   albuterol (ProAir HFA) 90 mcg/act inhaler   No No   Sig: Inhale 2 puffs every 6 (six) hours as needed for wheezing   albuterol (ProAir HFA) 90 mcg/act inhaler   No No   Sig: Inhale 2 puffs every 6 (six) hours as needed for wheezing   albuterol (Ventolin HFA) 90 mcg/act inhaler   No No   Sig: Inhale 1-2 puffs every 6 (six) hours as needed for wheezing   benzonatate (TESSALON PERLES) 100 mg capsule   No No   Sig: Take 1 capsule (100 mg total) by mouth 3 (three) times a day as needed for cough   budesonide-formoterol (Symbicort) 160-4.5 mcg/act inhaler   No No   Sig: Inhale 2 puffs 2 (two) times a day Rinse mouth after use.   Patient not taking: Reported on 4/3/2024   guaiFENesin (ROBITUSSIN) 100 MG/5ML oral liquid   No No    Sig: Take 5-10 mL (100-200 mg total) by mouth every 4 (four) hours as needed for cough   hydrOXYzine HCL (ATARAX) 25 mg tablet   No No   Sig: Take 1 tablet (25 mg total) by mouth every 6 (six) hours as needed for itching or anxiety   Patient not taking: Reported on 9/2/2023   pantoprazole (PROTONIX) 40 mg tablet   No No   Sig: Take 1 tablet (40 mg total) by mouth daily   Patient not taking: Reported on 4/3/2024   predniSONE 20 mg tablet   No No   Sig: Take 3 tablets (60 mg total) by mouth daily   sertraline (ZOLOFT) 50 mg tablet   No No   Sig: Take 1 tablet (50 mg total) by mouth daily   Patient not taking: Reported on 9/2/2023      Facility-Administered Medications Last Administration Doses Remaining   ipratropium (ATROVENT) 0.02 % inhalation solution 0.5 mg 9/14/2021  7:29 PM         Discharge Medication List as of 10/6/2024  9:44 PM        START taking these medications    Details   !! benzonatate (TESSALON PERLES) 100 mg capsule Take 1 capsule (100 mg total) by mouth every 8 (eight) hours, Starting Sun 10/6/2024, Normal      dexamethasone (DECADRON) 4 mg tablet Take 3 tablets (12 mg total) by mouth 1 (one) time for 1 dose Do not start before October 9, 2024., Starting Wed 10/9/2024, Normal       !! - Potential duplicate medications found. Please discuss with provider.        CONTINUE these medications which have NOT CHANGED    Details   !! albuterol (2.5 mg/3 mL) 0.083 % nebulizer solution Take 3 mL (2.5 mg total) by nebulization every 6 (six) hours as needed for wheezing or shortness of breath, Starting Tue 2/6/2024, Normal      !! albuterol (2.5 mg/3 mL) 0.083 % nebulizer solution Take 3 mL (2.5 mg total) by nebulization every 6 (six) hours as needed for wheezing or shortness of breath, Starting Fri 7/5/2024, Normal      !! albuterol (ProAir HFA) 90 mcg/act inhaler Inhale 2 puffs every 6 (six) hours as needed for wheezing, Starting Thu 8/22/2024, Normal      !! albuterol (ProAir HFA) 90 mcg/act inhaler  Inhale 2 puffs every 6 (six) hours as needed for wheezing, Starting Fri 10/4/2024, Normal      !! albuterol (PROVENTIL HFA,VENTOLIN HFA) 90 mcg/act inhaler Inhale 2 puffs every 4 (four) hours as needed for wheezing or shortness of breath, Starting Tue 6/11/2024, Until Wed 6/11/2025 at 2359, Normal      !! albuterol (Ventolin HFA) 90 mcg/act inhaler Inhale 1-2 puffs every 6 (six) hours as needed for wheezing, Starting Wed 4/3/2024, Normal      !! benzonatate (TESSALON PERLES) 100 mg capsule Take 1 capsule (100 mg total) by mouth 3 (three) times a day as needed for cough, Starting Wed 1/3/2024, Normal      budesonide-formoterol (Symbicort) 160-4.5 mcg/act inhaler Inhale 2 puffs 2 (two) times a day Rinse mouth after use., Starting Tue 2/6/2024, Normal      guaiFENesin (ROBITUSSIN) 100 MG/5ML oral liquid Take 5-10 mL (100-200 mg total) by mouth every 4 (four) hours as needed for cough, Starting Wed 1/3/2024, Normal      hydrOXYzine HCL (ATARAX) 25 mg tablet Take 1 tablet (25 mg total) by mouth every 6 (six) hours as needed for itching or anxiety, Starting Wed 6/7/2023, Normal      LORazepam (Ativan) 1 mg tablet Take 1 tablet (1 mg total) by mouth every 6 (six) hours as needed for anxiety, Starting Tue 9/19/2023, Normal      pantoprazole (PROTONIX) 40 mg tablet Take 1 tablet (40 mg total) by mouth daily, Starting Tue 12/5/2023, Normal      predniSONE 20 mg tablet Take 3 tablets (60 mg total) by mouth daily, Starting Wed 4/3/2024, Normal      sertraline (ZOLOFT) 50 mg tablet Take 1 tablet (50 mg total) by mouth daily, Starting Mon 6/12/2023, Until Sat 12/9/2023, Normal       !! - Potential duplicate medications found. Please discuss with provider.          ED SEPSIS DOCUMENTATION   Time reflects when diagnosis was documented in both MDM as applicable and the Disposition within this note       Time User Action Codes Description Comment    10/6/2024  9:36 PM Portillo Arreguin Add [J45.40] Moderate persistent asthma without  complication                  Portillo Arreguin, DO  10/07/24 122

## 2024-10-07 NOTE — ED NOTES
Pt unable to tolerate peak flow @ this time d/t coughing and inability to take a deep breath. Provider notified of same.      Cass Hathaway RN  10/07/24 7889

## 2024-10-07 NOTE — ED PROVIDER NOTES
Final diagnoses:   Asthma exacerbation   Viral syndrome     ED Disposition       ED Disposition   Discharge    Condition   Stable    Date/Time   Mon Oct 7, 2024  6:18 AM    Comment   Dileep Luis discharge to home/self care.                   Assessment & Plan         Medical Decision Making  Patient presents for shortness of breath.  On arrival the patient is diaphoretic, tachypneic, and tripoding with audible wheezing.  Differential diagnosis includes but is not limited to asthma exacerbation, viral illness, URI, bronchitis, pneumonia, pleural effusion, anemia, renal failure, cardiac arrhythmia, ACS, or anxiety.  Though limited by artifact, there is no obvious ischemic changes on EKG.  Rapid viral swab done several hours ago was negative.  Chest x-ray revealed no evidence of bacterial pneumonia.  After IV solumedrol, magnesium, and a anne nebulizer patient reported relief of symptoms.  During a subsequent ambulation trial patient maintained an oxygen saturation above 95% and had no increased work of breathing.  Due to the severity of today's exacerbation, discussed the R/B/A of observation/admission, and it was decided that the patient would continue with outpatient management.  He is in agreement with the treatment plan and all questions were answered.  Strict return precautions discussed and he verbalized understanding.  Follow-up with PCP, but return to the ED in the interim with new or worsening symptoms.    Problems Addressed:  Asthma exacerbation: acute illness or injury  Viral syndrome: acute illness or injury    Amount and/or Complexity of Data Reviewed  Labs: ordered. Decision-making details documented in ED Course.    Risk  OTC drugs.  Prescription drug management.        ED Course as of 10/07/24 0626   Mon Oct 07, 2024   0454 Patient is sitting comfortably on the stretcher with the anne nebulizer treatment running.  He is no longer diaphoretic or tachypneic.   0456 pH, Osbaldo(!): 7.431   0457 pCO2,  Osbaldo(!): 25.7   0457 HCO3, Osbaldo(!): 16.7   0500 WBC(!): 14.65   0500 Hemoglobin: 16.3   0500 Hematocrit: 47.8   0500 Platelet Count: 322   0600 Patient reports significant improvement with the anne neb, IV steroids, and magnesium.  Auscultation reveals residual expiratory wheezing, but improved air movement.  He is no longer tachypneic and can hold a conversation without difficulty.  Did offer observation due to the severity of today's asthma exacerbation.  Will reassess in 10-15 minutes to determine disposition.   0617 Discussed the R/B/A of admission versus discharge.  Patient reports improvement in his symptoms and would prefer to be discharged.  Will refill the patient's inhaler and nebulizer solutions.  Very strict return precautions discussed and patient verbalized understanding.       Medications   albuterol inhalation solution 10 mg (10 mg Nebulization Given 10/7/24 0428)   ipratropium (ATROVENT) 0.02 % inhalation solution 1 mg (1 mg Nebulization Given 10/7/24 0428)   sodium chloride 0.9 % inhalation solution 12 mL (12 mL Nebulization Given 10/7/24 0428)   magnesium sulfate 2 g/50 mL IVPB (premix) 2 g (0 g Intravenous Stopped 10/7/24 0445)   methylPREDNISolone sodium succinate (Solu-MEDROL) injection 80 mg (80 mg Intravenous Given 10/7/24 0431)   benzonatate (TESSALON PERLES) capsule 200 mg (200 mg Oral Given 10/7/24 0512)   ketorolac (TORADOL) injection 15 mg (15 mg Intravenous Given 10/7/24 0531)       ED Risk Strat Scores           SBIRT 20yo+      Flowsheet Row Most Recent Value   Initial Alcohol Screen: US AUDIT-C     1. How often do you have a drink containing alcohol? 1 Filed at: 10/07/2024 0500   2. How many drinks containing alcohol do you have on a typical day you are drinking?  1 Filed at: 10/07/2024 0500   3a. Male UNDER 65: How often do you have five or more drinks on one occasion? 1 Filed at: 10/07/2024 0500   Audit-C Score 3 Filed at: 10/07/2024 0500   JEREMIAH: How many times in the past year have  you...    Used an illegal drug or used a prescription medication for non-medical reasons? Never Filed at: 10/07/2024 0500              History of Present Illness       Chief Complaint   Patient presents with    Asthma     Pt reports asthma that started appr. 1 hour ago.  Reports doing 4 treatments at home with no relief.        Past Medical History:   Diagnosis Date    Anxiety     Asthma       Past Surgical History:   Procedure Laterality Date    APPENDECTOMY      FACIAL RECONSTRUCTION SURGERY      HERNIA REPAIR        History reviewed. No pertinent family history.   Social History     Tobacco Use    Smoking status: Former     Passive exposure: Never    Smokeless tobacco: Never   Vaping Use    Vaping status: Never Used   Substance Use Topics    Alcohol use: Yes     Comment: occ    Drug use: Yes     Types: Marijuana      E-Cigarette/Vaping    E-Cigarette Use Never User       E-Cigarette/Vaping Substances    Nicotine No     THC No     CBD No     Flavoring No     Other No     Unknown No       I have reviewed and agree with the history as documented.     The patient is a 34-year-old male with a past medical history of anxiety and asthma for which he has never been hospitalized, on BiPAP, or intubated.  He was seen in the department earlier today for cold-like symptoms.  He reports developing a cough, body aches, chills, and diaphoresis approximately 4 days ago.  The cough was productive and accompanied with worsening shortness of breath over the last two days.  He was around someone who tested positive for COVID.  Viral swab was negative for covid/influenza and chest x-ray showed no consolidation concerning for bacterial pneumonia.  Patient reported improvement with two DuoNebs, decadron, and tessalon perles.  He returns this morning with a worsening cough, wheezing, chest tightness, and shortness of breath that awoke him from sleep approximately 1 hour PTA.  He performed four DuoNeb treatments at home without relief.   The patient also endorses chest/upper abdominal cramping secondary to coughing.          Review of Systems   Constitutional:  Positive for chills and diaphoresis. Negative for fever.   HENT:  Negative for congestion, ear pain, rhinorrhea and sore throat.    Eyes:  Negative for pain and visual disturbance.   Respiratory:  Positive for cough, chest tightness, shortness of breath and wheezing.    Cardiovascular:  Positive for chest pain. Negative for palpitations.   Gastrointestinal:  Negative for abdominal pain, diarrhea, nausea and vomiting.   Genitourinary:  Negative for dysuria and hematuria.   Musculoskeletal:  Positive for myalgias. Negative for arthralgias and back pain.   Skin:  Negative for color change and rash.   Neurological:  Negative for dizziness, seizures, syncope, light-headedness and headaches.   All other systems reviewed and are negative.      Objective         ED Triage Vitals [10/07/24 0410]   Temperature Pulse Blood Pressure Respirations SpO2 Patient Position - Orthostatic VS   98.5 °F (36.9 °C) (!) 127 151/92 (!) 24 96 % Sitting      Temp Source Heart Rate Source BP Location FiO2 (%) Pain Score    Oral Monitor Left arm -- --      Vitals      Date and Time Temp Pulse SpO2 Resp BP Pain Score FACES Pain Rating User   10/07/24 0607 -- 106 95 % 18 -- -- -- SD   10/07/24 0534 -- 108 97 % 18 147/72 -- -- SD   10/07/24 0440 -- 101 100 % 20 155/78 -- -- SD   10/07/24 0410 98.5 °F (36.9 °C) 127 96 % 24 151/92 -- -- KA            Physical Exam  Vitals and nursing note reviewed.   Constitutional:       General: He is awake. He is in acute distress.      Appearance: He is well-developed and overweight. He is diaphoretic. He is not toxic-appearing.   HENT:      Head: Normocephalic and atraumatic.      Right Ear: External ear normal.      Left Ear: External ear normal.      Nose: Nose normal.      Mouth/Throat:      Lips: Pink.      Mouth: Mucous membranes are moist.      Pharynx: Oropharynx is clear. Uvula  midline.   Eyes:      General: Lids are normal. Vision grossly intact. Gaze aligned appropriately.      Conjunctiva/sclera: Conjunctivae normal.      Pupils: Pupils are equal, round, and reactive to light.   Cardiovascular:      Rate and Rhythm: Regular rhythm. Tachycardia present.      Heart sounds: S1 normal and S2 normal. No murmur heard.     No friction rub. No gallop.   Pulmonary:      Effort: Tachypnea and respiratory distress present. No accessory muscle usage or retractions.      Breath sounds: Decreased air movement present. Wheezing present. No rhonchi or rales.      Comments: Patient presents in acute respiratory distress.  He is tachypneic and tripoding.  He is only able to speak in short phrases, with significant difficulty, and has audible wheezing.  Auscultation of the lungs revealed decreased air movement and expiratory wheezing in all lung fields.  Abdominal:      General: Abdomen is flat.      Palpations: Abdomen is soft.      Tenderness: There is no right CVA tenderness, left CVA tenderness, guarding or rebound.       Musculoskeletal:      Cervical back: Normal, full passive range of motion without pain and neck supple. No rigidity or crepitus. No spinous process tenderness or muscular tenderness.      Thoracic back: Normal. No spasms, tenderness or bony tenderness.      Lumbar back: Normal. No spasms, tenderness or bony tenderness.   Lymphadenopathy:      Head:      Right side of head: No submental, submandibular, tonsillar, preauricular or posterior auricular adenopathy.      Left side of head: No submental, submandibular, tonsillar, preauricular or posterior auricular adenopathy.      Cervical: No cervical adenopathy.   Skin:     General: Skin is warm.      Capillary Refill: Capillary refill takes less than 2 seconds.      Coloration: Skin is not cyanotic or pale.      Findings: No rash.   Neurological:      Mental Status: He is alert and oriented to person, place, and time.   Psychiatric:          Attention and Perception: Attention normal.         Mood and Affect: Mood normal.         Speech: Speech normal.         Behavior: Behavior normal. Behavior is cooperative.         Results Reviewed       Procedure Component Value Units Date/Time    Manual Differential(PHLEBS Do Not Order) [[id number]]  (Abnormal) Collected: 10/07/24 0423    Lab Status: Final result Specimen: Blood from Arm, Right Updated: 10/07/24 0457     Segmented % 94 %      Lymphocytes % 4 %      Monocytes % 1 %      Eosinophils % 0 %      Basophils % 0 %      Myelocytes % 1 %      Absolute Neutrophils 13.77 Thousand/uL      Absolute Lymphocytes 0.59 Thousand/uL      Absolute Monocytes 0.15 Thousand/uL      Absolute Eosinophils 0.00 Thousand/uL      Absolute Basophils 0.00 Thousand/uL      Absolute Myelocytes 0.15 Thousand/uL      Total Counted --     RBC Morphology Normal     Platelet Estimate Adequate    Comprehensive metabolic panel [[id number]]  (Abnormal) Collected: 10/07/24 0423    Lab Status: Final result Specimen: Blood from Arm, Right Updated: 10/07/24 0448     Sodium 141 mmol/L      Potassium 4.1 mmol/L      Chloride 105 mmol/L      CO2 18 mmol/L      ANION GAP 18 mmol/L      BUN 13 mg/dL      Creatinine 1.14 mg/dL      Glucose 136 mg/dL      Calcium 10.2 mg/dL      AST 20 U/L      ALT 19 U/L      Alkaline Phosphatase 67 U/L      Total Protein 8.1 g/dL      Albumin 5.1 g/dL      Total Bilirubin 0.77 mg/dL      eGFR 84 ml/min/1.73sq m     Narrative:      National Kid[employer] guidelines for Chronic Kidney Disease (CKD):     Stage 1 with normal or high GFR (GFR > 90 mL/min/1.73 square meters)    Stage 2 Mild CKD (GFR = 60-89 mL/min/1.73 square meters)    Stage 3A Moderate CKD (GFR = 45-59 mL/min/1.73 square meters)    Stage 3B Moderate CKD (GFR = 30-44 mL/min/1.73 square meters)    Stage 4 Severe CKD (GFR = 15-29 mL/min/1.73 square meters)    Stage 5 End Stage CKD (GFR <15 mL/min/1.73 square meters)  Note: GFR calculation  is accurate only with a steady state creatinine    CBC and differential [[id number]]  (Abnormal) Collected: 10/07/24 0423    Lab Status: Final result Specimen: Blood from Arm, Right Updated: 10/07/24 0434     WBC 14.65 Thousand/uL      RBC 5.46 Million/uL      Hemoglobin 16.3 g/dL      Hematocrit 47.8 %      MCV 88 fL      MCH 29.9 pg      MCHC 34.1 g/dL      RDW 12.2 %      MPV 10.1 fL      Platelets 322 Thousands/uL     Blood gas, venous [[id number]]  (Abnormal) Collected: 10/07/24 0423    Lab Status: Final result Specimen: Blood from Arm, Right Updated: 10/07/24 0432     pH, Osbaldo 7.431     pCO2, Osbaldo 25.7 mm Hg      pO2, Osbaldo 79.0 mm Hg      HCO3, Osbaldo 16.7 mmol/L      Base Excess, Osbaldo -5.3 mmol/L      O2 Content, Sobaldo 23.3 ml/dL      O2 HGB, VENOUS 94.1 %             No orders to display       ECG 12 Lead Documentation Only    Date/Time: 10/7/2024 5:32 AM    Performed by: Clary Mohamud PA-C  Authorized by: Clary Mohamud PA-C    ECG reviewed by me, the ED Provider: yes    Patient location:  ED  Interpretation:     Interpretation: non-specific    Quality:     Tracing quality:  Limited by artifact  Rate:     ECG rate:  110    ECG rate assessment: tachycardic    Rhythm:     Rhythm: sinus tachycardia    QRS:     QRS axis:  Normal    QRS intervals:  Normal  Conduction:     Conduction: normal    ST segments:     ST segments:  Normal  T waves:     T waves: non-specific    Comments:      IL interval: 124ms  QRS duration: 78ms  QT/QTc: 334/452ms      ED Medication and Procedure Management   Prior to Admission Medications   Prescriptions Last Dose Informant Patient Reported? Taking?   LORazepam (Ativan) 1 mg tablet   No No   Sig: Take 1 tablet (1 mg total) by mouth every 6 (six) hours as needed for anxiety   albuterol (2.5 mg/3 mL) 0.083 % nebulizer solution   No No   Sig: Take 3 mL (2.5 mg total) by nebulization every 6 (six) hours as needed for wheezing or shortness of breath   albuterol (2.5 mg/3 mL) 0.083 %  nebulizer solution   No No   Sig: Take 3 mL (2.5 mg total) by nebulization every 6 (six) hours as needed for wheezing or shortness of breath   albuterol (PROVENTIL HFA,VENTOLIN HFA) 90 mcg/act inhaler   No No   Sig: Inhale 2 puffs every 4 (four) hours as needed for wheezing or shortness of breath   albuterol (ProAir HFA) 90 mcg/act inhaler   No No   Sig: Inhale 2 puffs every 6 (six) hours as needed for wheezing   albuterol (ProAir HFA) 90 mcg/act inhaler   No No   Sig: Inhale 2 puffs every 6 (six) hours as needed for wheezing   albuterol (Ventolin HFA) 90 mcg/act inhaler   No No   Sig: Inhale 1-2 puffs every 6 (six) hours as needed for wheezing   benzonatate (TESSALON PERLES) 100 mg capsule   No No   Sig: Take 1 capsule (100 mg total) by mouth 3 (three) times a day as needed for cough   benzonatate (TESSALON PERLES) 100 mg capsule   No No   Sig: Take 1 capsule (100 mg total) by mouth every 8 (eight) hours   budesonide-formoterol (Symbicort) 160-4.5 mcg/act inhaler   No No   Sig: Inhale 2 puffs 2 (two) times a day Rinse mouth after use.   Patient not taking: Reported on 4/3/2024   dexamethasone (DECADRON) 4 mg tablet   No No   Sig: Take 3 tablets (12 mg total) by mouth 1 (one) time for 1 dose Do not start before October 9, 2024.   guaiFENesin (ROBITUSSIN) 100 MG/5ML oral liquid   No No   Sig: Take 5-10 mL (100-200 mg total) by mouth every 4 (four) hours as needed for cough   hydrOXYzine HCL (ATARAX) 25 mg tablet   No No   Sig: Take 1 tablet (25 mg total) by mouth every 6 (six) hours as needed for itching or anxiety   Patient not taking: Reported on 9/2/2023   pantoprazole (PROTONIX) 40 mg tablet   No No   Sig: Take 1 tablet (40 mg total) by mouth daily   Patient not taking: Reported on 4/3/2024   predniSONE 20 mg tablet   No No   Sig: Take 3 tablets (60 mg total) by mouth daily   sertraline (ZOLOFT) 50 mg tablet   No No   Sig: Take 1 tablet (50 mg total) by mouth daily   Patient not taking: Reported on 9/2/2023       Facility-Administered Medications Last Administration Doses Remaining   ipratropium (ATROVENT) 0.02 % inhalation solution 0.5 mg 9/14/2021  7:29 PM         Patient's Medications   Discharge Prescriptions    ALBUTEROL (PROAIR HFA) 90 MCG/ACT INHALER    Inhale 2 puffs every 6 (six) hours as needed for wheezing       Start Date: 10/7/2024 End Date: --       Order Dose: 2 puffs       Quantity: 8.5 g    Refills: 0    DEXTROMETHORPHAN-GUAIFENESIN (MUCINEX DM)  MG PER 12 HR TABLET    Take 1 tablet by mouth every 12 (twelve) hours for 7 days       Start Date: 10/7/2024 End Date: 10/14/2024       Order Dose: 1 tablet       Quantity: 14 tablet    Refills: 0    IPRATROPIUM-ALBUTEROL (DUO-NEB) 0.5-2.5 MG/3 ML NEBULIZER SOLUTION    Take 3 mL by nebulization every 6 (six) hours as needed for wheezing or shortness of breath       Start Date: 10/7/2024 End Date: --       Order Dose: 3 mL       Quantity: 90 mL    Refills: 0     No discharge procedures on file.  ED SEPSIS DOCUMENTATION   Time reflects when diagnosis was documented in both MDM as applicable and the Disposition within this note       Time User Action Codes Description Comment    10/7/2024  5:41 AM Clary Mohamud [J45.901] Asthma exacerbation     10/7/2024  5:41 AM Clary Mohamud [B34.9] Viral syndrome                  Clary Mohamud PA-C  10/07/24 0626

## 2024-10-07 NOTE — ED NOTES
This tech did an ambulatory pulse oximetry test on this Pt.   Pt SpO2 during ambulation: 96-97% HR: 115-117 BPM     Brien Strange  10/07/24 0537

## 2024-10-07 NOTE — ED NOTES
Pt given warm blanket and tv remote. Pt reports improvement of symptoms but still coughs with deep inhalation. Provider in to speak with pt for same, pt tolerating breathing tx well, call bell within reach.      Cass Hathaway RN  10/07/24 4354

## 2024-11-01 ENCOUNTER — OFFICE VISIT (OUTPATIENT)
Dept: FAMILY MEDICINE CLINIC | Facility: CLINIC | Age: 34
End: 2024-11-01

## 2024-11-01 VITALS
HEIGHT: 64 IN | HEART RATE: 78 BPM | TEMPERATURE: 96.7 F | BODY MASS INDEX: 31.92 KG/M2 | SYSTOLIC BLOOD PRESSURE: 112 MMHG | DIASTOLIC BLOOD PRESSURE: 66 MMHG | OXYGEN SATURATION: 98 % | WEIGHT: 187 LBS | RESPIRATION RATE: 16 BRPM

## 2024-11-01 DIAGNOSIS — J45.40 MODERATE PERSISTENT ASTHMA WITHOUT COMPLICATION: Primary | ICD-10-CM

## 2024-11-01 DIAGNOSIS — Z76.0 ENCOUNTER FOR MEDICATION REFILL: ICD-10-CM

## 2024-11-01 DIAGNOSIS — J45.901 ASTHMA EXACERBATION: ICD-10-CM

## 2024-11-01 DIAGNOSIS — Z00.01 ABNORMAL WELLNESS EXAM: ICD-10-CM

## 2024-11-01 PROCEDURE — 99203 OFFICE O/P NEW LOW 30 MIN: CPT | Performed by: FAMILY MEDICINE

## 2024-11-01 PROCEDURE — 99395 PREV VISIT EST AGE 18-39: CPT | Performed by: FAMILY MEDICINE

## 2024-11-01 RX ORDER — MONTELUKAST SODIUM 10 MG/1
10 TABLET ORAL
Qty: 90 TABLET | Refills: 3 | Status: SHIPPED | OUTPATIENT
Start: 2024-11-01

## 2024-11-01 RX ORDER — IPRATROPIUM BROMIDE AND ALBUTEROL SULFATE 2.5; .5 MG/3ML; MG/3ML
3 SOLUTION RESPIRATORY (INHALATION) EVERY 6 HOURS PRN
Qty: 90 ML | Refills: 0 | Status: SHIPPED | OUTPATIENT
Start: 2024-11-01

## 2024-11-01 RX ORDER — ALBUTEROL SULFATE 90 UG/1
2 INHALANT RESPIRATORY (INHALATION) EVERY 6 HOURS PRN
Qty: 18 G | Refills: 3 | Status: SHIPPED | OUTPATIENT
Start: 2024-11-01

## 2024-11-01 RX ORDER — ALBUTEROL SULFATE 0.83 MG/ML
2.5 SOLUTION RESPIRATORY (INHALATION) EVERY 6 HOURS PRN
Qty: 75 ML | Refills: 0 | Status: SHIPPED | OUTPATIENT
Start: 2024-11-01

## 2024-11-01 NOTE — PROGRESS NOTES
Ambulatory Visit  Name: Dileep Luis      : 1990      MRN: [id number]  Encounter Provider: David Felix MD  Encounter Date: 2024   [facility]    Assessment & Plan  Moderate persistent asthma without complication  Prescribed patient albuterol inhaler, Duo-Neb and albuterol nebulizer solution to be used as needed for exacerbations. Plan to start montelukast 10mg tab daily. Discussed potential side effects. Samples of maintenance and emergency inhalers given today in office. Advised patient he can call the office for samples if needed. Discussed plan is to reduce need for ED visits. Plan to follow up in 1 year or sooner if needed. Discussed patient could have multiple inhalers that he keeps in different places to ensure if he ever needs it he will be able to access it. He can call the office for refills when he needs them.   Orders:    Ambulatory Referral to Family Practice    montelukast (SINGULAIR) 10 mg tablet; Take 1 tablet (10 mg total) by mouth daily at bedtime    Encounter for medication refill  Refilled patient's asthma medications and plan to start new regimen.   Orders:    albuterol (2.5 mg/3 mL) 0.083 % nebulizer solution; Take 3 mL (2.5 mg total) by nebulization every 6 (six) hours as needed for wheezing or shortness of breath    Asthma exacerbation  Prescriptions of albuterol and Duo-Neb solutions prescribed. Plan to start montelukast daily.   Orders:    albuterol (ProAir HFA) 90 mcg/act inhaler; Inhale 2 puffs every 6 (six) hours as needed for wheezing    ipratropium-albuterol (DUO-NEB) 0.5-2.5 mg/3 mL nebulizer solution; Take 3 mL by nebulization every 6 (six) hours as needed for wheezing or shortness of breath    Abnormal wellness exam  It was discussed about immunizations, diet, exercise and safety measures.           Depression Screening and Follow-up Plan: Patient's depression screening was positive with a PHQ-9 score of 7.  "Patient with underlying depression and was advised to continue current medications as prescribed.       History of Present Illness     35 y/o male with PMH of asthma presenting to the office to establish care. He has been in and out of ED multiple times for asthma exacerbations. He has been using albuterol at home but does not have insurance so he does not always have medication to use at home during exacerbations. He states his triggers are seasonal changes and viral illnesses. He says he notices it is usually worse in morning or nights. His last ED visits were in early October. He is not having any acute symptoms of his asthma at today's visit.     Asthma  There is no cough, shortness of breath or wheezing. Pertinent negatives include no chest pain, fever or headaches. His past medical history is significant for asthma.       History obtained from : patient  Review of Systems   Constitutional:  Negative for chills and fever.   HENT:  Negative for congestion.    Respiratory:  Negative for cough, shortness of breath, wheezing and stridor.    Cardiovascular:  Negative for chest pain.   Gastrointestinal:  Negative for abdominal pain.   Genitourinary:  Negative for dysuria.   Musculoskeletal:  Negative for arthralgias.   Neurological:  Negative for headaches.         Objective     /66 (BP Location: Left arm, Patient Position: Sitting, Cuff Size: Standard)   Pulse 78   Temp (!) 96.7 °F (35.9 °C) (Tympanic)   Resp 16   Ht 5' 4\" (1.626 m)   Wt 84.8 kg (187 lb)   SpO2 98%   BMI 32.10 kg/m²     Physical Exam  Constitutional:       Appearance: Normal appearance.   HENT:      Right Ear: Tympanic membrane normal.      Left Ear: Tympanic membrane normal.   Eyes:      Conjunctiva/sclera: Conjunctivae normal.   Cardiovascular:      Rate and Rhythm: Normal rate and regular rhythm.   Pulmonary:      Effort: Pulmonary effort is normal.      Breath sounds: Normal breath sounds. No stridor. No wheezing or rales. "   Abdominal:      General: Abdomen is flat.      Palpations: Abdomen is soft.   Musculoskeletal:         General: No swelling.   Skin:     General: Skin is warm and dry.   Neurological:      Mental Status: He is alert.   Psychiatric:         Mood and Affect: Mood normal.     Depression Screening Follow-up Plan: Patient's depression screening was positive with a PHQ-2 score of . Their PHQ-9 score was 7. Patient with underlying depression and was advised to continue current medications as prescribed.

## 2024-11-01 NOTE — ASSESSMENT & PLAN NOTE
Prescribed patient albuterol inhaler, Duo-Neb and albuterol nebulizer solution to be used as needed for exacerbations. Plan to start montelukast 10mg tab daily. Discussed potential side effects. Samples of maintenance and emergency inhalers given today in office. Advised patient he can call the office for samples if needed. Discussed plan is to reduce need for ED visits. Plan to follow up in 1 year or sooner if needed. Discussed patient could have multiple inhalers that he keeps in different places to ensure if he ever needs it he will be able to access it. He can call the office for refills when he needs them.   Orders:    Ambulatory Referral to Family Practice    montelukast (SINGULAIR) 10 mg tablet; Take 1 tablet (10 mg total) by mouth daily at bedtime

## 2025-01-06 DIAGNOSIS — J45.901 ASTHMA EXACERBATION: ICD-10-CM

## 2025-01-07 RX ORDER — ALBUTEROL SULFATE 90 UG/1
2 INHALANT RESPIRATORY (INHALATION) EVERY 6 HOURS PRN
Qty: 18 G | Refills: 3 | Status: SHIPPED | OUTPATIENT
Start: 2025-01-07

## 2025-02-28 DIAGNOSIS — J45.901 ASTHMA EXACERBATION: ICD-10-CM

## 2025-02-28 RX ORDER — ALBUTEROL SULFATE 90 UG/1
2 INHALANT RESPIRATORY (INHALATION) EVERY 6 HOURS PRN
Qty: 18 G | Refills: 3 | Status: SHIPPED | OUTPATIENT
Start: 2025-02-28

## 2025-02-28 NOTE — TELEPHONE ENCOUNTER
Reason for call: NOT A DUPLICATE pharmacy told patient no refills.  They did not get the script from January last  script was December and had no refills left.  Patient is completely out      [x] Refill   [] Prior Auth  [] Other:     Office:   [x] PCP/Provider - David Felix MD   [] Specialty/Provider -     Medication: albuterol (PROVENTIL HFA,VENTOLIN HFA) 90 mcg/act inhaler     Dose/Frequency: INHALE 2 PUFFS BY MOUTH EVERY 6 HOURS AS NEEDED FOR WHEEZING     Quantity: 18.g    Pharmacy: [id number] - [place], [place] - [address] [facility] [phone]    Does the patient have enough for 3 days?   [] Yes   [x] No - Send as HP to POD

## 2025-04-17 DIAGNOSIS — J45.901 ASTHMA EXACERBATION: ICD-10-CM

## 2025-04-17 RX ORDER — ALBUTEROL SULFATE 90 UG/1
2 INHALANT RESPIRATORY (INHALATION) EVERY 6 HOURS PRN
Qty: 18 G | Refills: 1 | Status: SHIPPED | OUTPATIENT
Start: 2025-04-17

## 2025-04-17 NOTE — TELEPHONE ENCOUNTER
Pt last seen 11/1/24.  Pharmacy requesting refill on:  albuterol (PROVENTIL HFA,VENTOLIN HFA) 90 mcg/act inhaler

## 2025-04-17 NOTE — TELEPHONE ENCOUNTER
Pharmacy needs new prescription sent over per patient.     Reason for call:   [x] Refill   [] Prior Auth  [] Other:     Office:   [x] PCP/Provider -   [] Specialty/Provider -     Medication: Albuterol     Dose/Frequency: 90 mcg/act inhaler. Inhale 2 puffs every 6 hours PRN for wheezing     Quantity: 18 g     Pharmacy: [id number] - [facility] [address] [facility] [address] [phone]     Local Pharmacy   Does the patient have enough for 3 days?   [] Yes   [x] No - Send as HP to POD    Mail Away Pharmacy   Does the patient have enough for 10 days?   [] Yes   [] No - Send as HP to POD

## 2025-04-22 NOTE — TELEPHONE ENCOUNTER
Patient called the RX Refill Line. Message is being forwarded to the office.     Patient is requesting to have his albuterol inhaler refilled, [place] now [place] much longer then he thought he was going to be  Pt wanted script sent to a different [facility] but I told him they should be able to pull the script that is on file, pt will call them and then call us back if any issues

## 2025-05-12 DIAGNOSIS — J45.901 ASTHMA EXACERBATION: ICD-10-CM

## 2025-05-12 RX ORDER — ALBUTEROL SULFATE 90 UG/1
2 INHALANT RESPIRATORY (INHALATION) EVERY 6 HOURS PRN
Qty: 18 G | Refills: 5 | Status: SHIPPED | OUTPATIENT
Start: 2025-05-12

## 2025-05-12 NOTE — TELEPHONE ENCOUNTER
Pt states pharmacy obed is telling him he has no refills    Reason for call:   [x] Refill   [] Prior Auth  [] Other:     Office:   [x] PCP/Provider - [facility]   [] Specialty/Provider -     Medication:   ~ albuterol (PROVENTIL HFA,VENTOLIN HFA) 90 mcg/act inhaler - Inhale 2 puffs every 6 (six) hours as needed for wheezing     Pharmacy:   [id number] - [place] - [address] [facility]   Does the patient have enough for 3 days?   [] Yes   [x] No - Send as HP to POD

## 2025-06-02 ENCOUNTER — TELEPHONE (OUTPATIENT)
Dept: FAMILY MEDICINE CLINIC | Facility: CLINIC | Age: 35
End: 2025-06-02

## 2025-06-02 NOTE — TELEPHONE ENCOUNTER
Patient called requesting refill for albuterol (PROVENTIL HFA,VENTOLIN HFA) 90 mcg/act inhaler . Patient made aware medication was refilled on 5/12/25 for 18g with 5 refills to [facility] pharmacy. Patient instructed to contact the pharmacy and speak with someone directly to obtain refills of medication. Patient advised to call back for refill if their pharmacy is unable to assist them. Patient verbalized understanding.

## 2025-06-16 ENCOUNTER — APPOINTMENT (EMERGENCY)
Dept: CT IMAGING | Facility: HOSPITAL | Age: 35
End: 2025-06-16

## 2025-06-16 ENCOUNTER — HOSPITAL ENCOUNTER (EMERGENCY)
Facility: HOSPITAL | Age: 35
Discharge: HOME | End: 2025-06-17
Attending: EMERGENCY MEDICINE | Admitting: EMERGENCY MEDICINE

## 2025-06-16 VITALS
TEMPERATURE: 98 F | BODY MASS INDEX: 34.84 KG/M2 | WEIGHT: 203 LBS | DIASTOLIC BLOOD PRESSURE: 87 MMHG | OXYGEN SATURATION: 98 % | RESPIRATION RATE: 16 BRPM | HEART RATE: 85 BPM | SYSTOLIC BLOOD PRESSURE: 159 MMHG

## 2025-06-16 DIAGNOSIS — K92.1 HEMATOCHEZIA: ICD-10-CM

## 2025-06-16 DIAGNOSIS — K57.92 DIVERTICULITIS: Primary | ICD-10-CM

## 2025-06-16 LAB
ALBUMIN SERPL BCG-MCNC: 5 G/DL (ref 3.5–5)
ALP SERPL-CCNC: 60 U/L (ref 34–104)
ALT SERPL W P-5'-P-CCNC: 27 U/L (ref 7–52)
ANION GAP SERPL CALCULATED.3IONS-SCNC: 8 MMOL/L (ref 4–13)
AST SERPL W P-5'-P-CCNC: 24 U/L (ref 13–39)
BASOPHILS # BLD AUTO: 0.14 THOUSANDS/ÂΜL (ref 0–0.1)
BASOPHILS NFR BLD AUTO: 1 % (ref 0–1)
BILIRUB SERPL-MCNC: 0.43 MG/DL (ref 0.2–1)
BILIRUB UR QL STRIP: NEGATIVE
BUN SERPL-MCNC: 11 MG/DL (ref 5–25)
CALCIUM SERPL-MCNC: 10.1 MG/DL (ref 8.4–10.2)
CHLORIDE SERPL-SCNC: 105 MMOL/L (ref 96–108)
CLARITY UR: CLEAR
CO2 SERPL-SCNC: 26 MMOL/L (ref 21–32)
COLOR UR: NORMAL
CREAT SERPL-MCNC: 1.09 MG/DL (ref 0.6–1.3)
EOSINOPHIL # BLD AUTO: 0.87 THOUSAND/ÂΜL (ref 0–0.61)
EOSINOPHIL NFR BLD AUTO: 5 % (ref 0–6)
ERYTHROCYTE [DISTWIDTH] IN BLOOD BY AUTOMATED COUNT: 12.6 % (ref 11.6–15.1)
GFR SERPL CREATININE-BSD FRML MDRD: 88 ML/MIN/1.73SQ M
GLUCOSE SERPL-MCNC: 85 MG/DL (ref 65–140)
GLUCOSE UR STRIP-MCNC: NEGATIVE MG/DL
HCT VFR BLD AUTO: 39.9 % (ref 36.5–49.3)
HEMOCCULT STL QL IA: POSITIVE
HGB BLD-MCNC: 13.8 G/DL (ref 12–17)
HGB UR QL STRIP.AUTO: NEGATIVE
IMM GRANULOCYTES # BLD AUTO: 0.23 THOUSAND/UL (ref 0–0.2)
IMM GRANULOCYTES NFR BLD AUTO: 1 % (ref 0–2)
KETONES UR STRIP-MCNC: NEGATIVE MG/DL
LEUKOCYTE ESTERASE UR QL STRIP: NEGATIVE
LIPASE SERPL-CCNC: 10 U/L (ref 11–82)
LYMPHOCYTES # BLD AUTO: 4.24 THOUSANDS/ÂΜL (ref 0.6–4.47)
LYMPHOCYTES NFR BLD AUTO: 25 % (ref 14–44)
MCH RBC QN AUTO: 30 PG (ref 26.8–34.3)
MCHC RBC AUTO-ENTMCNC: 34.6 G/DL (ref 31.4–37.4)
MCV RBC AUTO: 87 FL (ref 82–98)
MONOCYTES # BLD AUTO: 1.27 THOUSAND/ÂΜL (ref 0.17–1.22)
MONOCYTES NFR BLD AUTO: 8 % (ref 4–12)
NEUTROPHILS # BLD AUTO: 10.19 THOUSANDS/ÂΜL (ref 1.85–7.62)
NEUTS SEG NFR BLD AUTO: 60 % (ref 43–75)
NITRITE UR QL STRIP: NEGATIVE
NRBC BLD AUTO-RTO: 0 /100 WBCS
PH UR STRIP.AUTO: 5.5 [PH]
PLATELET # BLD AUTO: 288 THOUSANDS/UL (ref 149–390)
PMV BLD AUTO: 9.6 FL (ref 8.9–12.7)
POTASSIUM SERPL-SCNC: 4.1 MMOL/L (ref 3.5–5.3)
PROT SERPL-MCNC: 7.7 G/DL (ref 6.4–8.4)
PROT UR STRIP-MCNC: NEGATIVE MG/DL
RBC # BLD AUTO: 4.6 MILLION/UL (ref 3.88–5.62)
SODIUM SERPL-SCNC: 139 MMOL/L (ref 135–147)
SP GR UR STRIP.AUTO: 1.02 (ref 1–1.03)
UROBILINOGEN UR STRIP-ACNC: <2 MG/DL
WBC # BLD AUTO: 16.94 THOUSAND/UL (ref 4.31–10.16)

## 2025-06-16 PROCEDURE — 83690 ASSAY OF LIPASE: CPT

## 2025-06-16 PROCEDURE — 99283 EMERGENCY DEPT VISIT LOW MDM: CPT

## 2025-06-16 PROCEDURE — 74177 CT ABD & PELVIS W/CONTRAST: CPT

## 2025-06-16 PROCEDURE — 85025 COMPLETE CBC W/AUTO DIFF WBC: CPT

## 2025-06-16 PROCEDURE — 81003 URINALYSIS AUTO W/O SCOPE: CPT

## 2025-06-16 PROCEDURE — 96361 HYDRATE IV INFUSION ADD-ON: CPT

## 2025-06-16 PROCEDURE — G0328 FECAL BLOOD SCRN IMMUNOASSAY: HCPCS

## 2025-06-16 PROCEDURE — 96360 HYDRATION IV INFUSION INIT: CPT

## 2025-06-16 PROCEDURE — 80053 COMPREHEN METABOLIC PANEL: CPT

## 2025-06-16 PROCEDURE — 99285 EMERGENCY DEPT VISIT HI MDM: CPT

## 2025-06-16 PROCEDURE — 36415 COLL VENOUS BLD VENIPUNCTURE: CPT

## 2025-06-16 RX ADMIN — IOHEXOL 85 ML: 350 INJECTION, SOLUTION INTRAVENOUS at 22:20

## 2025-06-16 RX ADMIN — SODIUM CHLORIDE 1000 ML: 0.9 INJECTION, SOLUTION INTRAVENOUS at 21:27

## 2025-06-16 RX ADMIN — AMOXICILLIN AND CLAVULANATE POTASSIUM 1 TABLET: 875; 125 TABLET, FILM COATED ORAL at 23:44

## 2025-06-16 NOTE — Clinical Note
Dilepe Luis was seen and treated in our emergency department on 6/16/2025.                Diagnosis:     Dileep  .    He may return on this date:          If you have any questions or concerns, please don't hesitate to call.      Mona Rodarte PA-C    ______________________________           _______________          _______________  Hospital Representative                              Date                                Time

## 2025-06-17 NOTE — ED PROVIDER NOTES
Time reflects when diagnosis was documented in both MDM as applicable and the Disposition within this note       Time User Action Codes Description Comment    6/16/2025 11:44 PM Mona Rodarte Add [K57.92] Diverticulitis     6/16/2025 11:46 PM Mona Rodarte Add [K92.1] Hematochezia           ED Disposition       ED Disposition   Discharge    Condition   Stable    Date/Time   Mon Jun 16, 2025 11:44 PM    Comment   Dileep Luis discharge to home/self care.                   Assessment & Plan       Medical Decision Making  Patient seen, examined and noted to have diverticulitis and hematochezia.  Coming in for bright red blood per rectum, softer stools and lower abdominal pain.  No history of diverticulitis.  No nausea or vomiting.  No hemorrhoids or fissures on my exam.  Will initiate labs and CT scan.  Patient's white blood cell count has been persistently elevated every time he has had a CBC checked in the last 5 years.  Not meeting any other SIRS criteria.  CT scan showing diverticulitis without complication.  Will give first dose of antibiotic here and send patient home with a course to finish as well as a referral to GI.  Strict ER return precautions discussed with patient expresses understanding of.    Differential diagnosis includes but is not limited to anal fissure, hemorrhoid, diverticulitis, diverticulosis, IBS, colitis, mesenteric ischemia, doubt upper GI bleed or mass    Patient's labs notable for: Positive fecal occult blood test.    Imaging revealed:   Diverticulitis    Patient appears well, is nontoxic appearing, he expresses understanding and agrees with plan of care at this time.  In light of this patient would benefit from outpatient management.    Patient was rx'd: augmentin    Patient at time of discharge well-appearing in no acute distress, all questions answered. Patient agreeable to plan.  Patient's vitals, lab/imaging results, diagnosis, and treatment plan were discussed with the  patient. All new/changed medications were discussed with patient, specifically, route of administration, how often and when to take, and where they can be picked up. Strict return precautions as well as close follow up with PCP was discussed with the patient and the patient was agreeable to my recommendations. Patient verbally acknowledged understanding of the above communications. Strict return precautions were provided. All labs reviewed and utilized in the medical decision making process.    Amount and/or Complexity of Data Reviewed  Labs: ordered. Decision-making details documented in ED Course.  Radiology: ordered. Decision-making details documented in ED Course.    Risk  Prescription drug management.        ED Course as of 06/17/25 0306   Mon Jun 16, 2025 2149 OCCULT BLD, FECAL IMMUNOLOGICAL(!): Positive   2149 UA w Reflex to Microscopic w Reflex to Culture   2230 WBC(!): 16.94  Persistently elevated for the last 5 years   2337 CT abdomen pelvis with contrast  Mild acute diverticulitis of the mid sigmoid colon with no evidence of complication. No free air or free fluid.       Medications   sodium chloride 0.9 % bolus 1,000 mL (0 mL Intravenous Stopped 6/16/25 2345)   iohexol (OMNIPAQUE) 350 MG/ML injection (MULTI-DOSE) 85 mL (85 mL Intravenous Given 6/16/25 2220)   amoxicillin-clavulanate (AUGMENTIN) 875-125 mg per tablet 1 tablet (1 tablet Oral Given 6/16/25 2344)       ED Risk Strat Scores                    No data recorded                            History of Present Illness       Chief Complaint   Patient presents with    Rectal Bleeding     Rectal bleeding starting 3 days ago. First two days were dark red, today was light red. Denies any lightheadedness, dizziness, nausea or vomiting but endorses lower abd pain that is fixed.        Past Medical History[1]   Past Surgical History[2]   Family History[3]   Social History[4]   E-Cigarette/Vaping    E-Cigarette Use Never User       E-Cigarette/Vaping  Substances    Nicotine No     THC No     CBD No     Flavoring No     Other No     Unknown No       I have reviewed and agree with the history as documented.     Dileep Luis is a 35 y.o. male with a PMH of asthma  presenting to the ED on June 16, 2025 with rectal bleeding. Patient endorses that he started having rectal bleeding about 3 days ago.  He notes that his stool is softer than normal for him but is not having diarrhea.  The first 2 days the blood was dark red in color however today is light red.  Patient also notes that today he started having pain across his lower abdomen.  He also notes that he has been urinating more frequently but also feels it may be due to to him drinking more water recently as he stopped drinking soda.  Patient is adopted and is unsure if he has a family history of colon cancer.  Has never had a colonoscopy.  Has had intermittent rectal bleeding before but it usually always is limited to one episode.  Was recently sick with an upper respiratory infection, had congestion and fevers last week however has not had any fevers in the last 2 days.  Coworkers were sick with a similar URI.  Patient denies pain, shortness of breath, dizziness, lightheadedness, dysuria or any other complaints at this time.             Review of Systems   Constitutional:  Negative for chills and fever.   Respiratory:  Negative for shortness of breath.    Cardiovascular:  Negative for chest pain and palpitations.   Gastrointestinal:  Positive for abdominal pain, blood in stool and diarrhea. Negative for constipation, nausea, rectal pain and vomiting.   Genitourinary:  Positive for frequency. Negative for dysuria, hematuria and urgency.   Neurological:  Negative for dizziness, syncope and light-headedness.           Objective       ED Triage Vitals   Temperature Pulse Blood Pressure Respirations SpO2 Patient Position - Orthostatic VS   06/16/25 2035 06/16/25 2033 06/16/25 2033 06/16/25 2033 06/16/25 2033  06/16/25 2033   98 °F (36.7 °C) 85 159/87 16 98 % Sitting      Temp Source Heart Rate Source BP Location FiO2 (%) Pain Score    06/16/25 2035 06/16/25 2033 06/16/25 2033 -- --    Oral Monitor Right arm        Vitals      Date and Time Temp Pulse SpO2 Resp BP Pain Score FACES Pain Rating User   06/16/25 2035 98 °F (36.7 °C) -- -- -- -- -- -- RM   06/16/25 2033 -- 85 98 % 16 159/87 -- -- RM            Physical Exam  Vitals and nursing note reviewed. Exam conducted with a chaperone present.   Constitutional:       General: He is not in acute distress.     Appearance: Normal appearance. He is normal weight. He is not ill-appearing, toxic-appearing or diaphoretic.   HENT:      Nose: No rhinorrhea.     Cardiovascular:      Rate and Rhythm: Normal rate and regular rhythm.      Heart sounds: Normal heart sounds. No murmur heard.     No friction rub. No gallop.   Pulmonary:      Effort: Pulmonary effort is normal. No respiratory distress.      Breath sounds: Normal breath sounds. No wheezing, rhonchi or rales.   Abdominal:      General: Abdomen is flat. There is no distension.      Palpations: There is no mass.      Tenderness: There is abdominal tenderness in the right lower quadrant, suprapubic area and left lower quadrant. There is no right CVA tenderness, left CVA tenderness, guarding or rebound.      Hernia: No hernia is present.   Genitourinary:     Rectum: Normal. Guaiac result positive. No mass, tenderness, anal fissure, external hemorrhoid or internal hemorrhoid. Normal anal tone.     Musculoskeletal:      Cervical back: Normal range of motion and neck supple. No rigidity.     Skin:     General: Skin is warm.      Capillary Refill: Capillary refill takes less than 2 seconds.      Coloration: Skin is not pale.      Findings: No erythema.     Neurological:      General: No focal deficit present.      Mental Status: He is alert and oriented to person, place, and time. Mental status is at baseline.      Motor: No  weakness.      Gait: Gait normal.     Psychiatric:         Mood and Affect: Mood normal.         Behavior: Behavior normal.         Results Reviewed       Procedure Component Value Units Date/Time    CBC and differential [[id number]]  (Abnormal) Collected: 06/16/25 2203    Lab Status: Final result Specimen: Blood from Arm, Left Updated: 06/16/25 2216     WBC 16.94 Thousand/uL      RBC 4.60 Million/uL      Hemoglobin 13.8 g/dL      Hematocrit 39.9 %      MCV 87 fL      MCH 30.0 pg      MCHC 34.6 g/dL      RDW 12.6 %      MPV 9.6 fL      Platelets 288 Thousands/uL      nRBC 0 /100 WBCs      Segmented % 60 %      Immature Grans % 1 %      Lymphocytes % 25 %      Monocytes % 8 %      Eosinophils Relative 5 %      Basophils Relative 1 %      Absolute Neutrophils 10.19 Thousands/µL      Absolute Immature Grans 0.23 Thousand/uL      Absolute Lymphocytes 4.24 Thousands/µL      Absolute Monocytes 1.27 Thousand/µL      Eosinophils Absolute 0.87 Thousand/µL      Basophils Absolute 0.14 Thousands/µL     Comprehensive metabolic panel [[id number]] Collected: 06/16/25 2116    Lab Status: Final result Specimen: Blood from Arm, Right Updated: 06/16/25 2151     Sodium 139 mmol/L      Potassium 4.1 mmol/L      Chloride 105 mmol/L      CO2 26 mmol/L      ANION GAP 8 mmol/L      BUN 11 mg/dL      Creatinine 1.09 mg/dL      Glucose 85 mg/dL      Calcium 10.1 mg/dL      AST 24 U/L      ALT 27 U/L      Alkaline Phosphatase 60 U/L      Total Protein 7.7 g/dL      Albumin 5.0 g/dL      Total Bilirubin 0.43 mg/dL      eGFR 88 ml/min/1.73sq m     Narrative:      National Kidney Disease Foundation guidelines for Chronic Kidney Disease (CKD):     Stage 1 with normal or high GFR (GFR > 90 mL/min/1.73 square meters)    Stage 2 Mild CKD (GFR = 60-89 mL/min/1.73 square meters)    Stage 3A Moderate CKD (GFR = 45-59 mL/min/1.73 square meters)    Stage 3B Moderate CKD (GFR = 30-44 mL/min/1.73 square meters)    Stage 4 Severe CKD (GFR = 15-29 mL/min/1.73  square meters)    Stage 5 End Stage CKD (GFR <15 mL/min/1.73 square meters)  Note: GFR calculation is accurate only with a steady state creatinine    Lipase [[id number]]  (Abnormal) Collected: 06/16/25 2116    Lab Status: Final result Specimen: Blood from Arm, Right Updated: 06/16/25 2151     Lipase 10 u/L     iFOBT (FIT) [[id number]]  (Abnormal) Collected: 06/16/25 2129    Lab Status: Final result Specimen: Stool from Per Rectum Updated: 06/16/25 2142     OCCULT BLD, FECAL IMMUNOLOGICAL Positive    Narrative:        Performed by Fecal Immunochemical Test.    UA w Reflex to Microscopic w Reflex to Culture [[id number]] Collected: 06/16/25 2117    Lab Status: Final result Specimen: Urine, Other Updated: 06/16/25 2134     Color, UA Light Yellow     Clarity, UA Clear     Specific Gravity, UA 1.021     pH, UA 5.5     Leukocytes, UA Negative     Nitrite, UA Negative     Protein, UA Negative mg/dl      Glucose, UA Negative mg/dl      Ketones, UA Negative mg/dl      Urobilinogen, UA <2.0 mg/dl      Bilirubin, UA Negative     Occult Blood, UA Negative            CT abdomen pelvis with contrast   Final Interpretation by Jan Pritchard MD (06/16 2324)      Mild acute diverticulitis of the mid sigmoid colon with no evidence of complication. No free air or free fluid.         Workstation performed: [id number]             Procedures    ED Medication and Procedure Management   Prior to Admission Medications   Prescriptions Last Dose Informant Patient Reported? Taking?   albuterol (2.5 mg/3 mL) 0.083 % nebulizer solution   No No   Sig: Take 3 mL (2.5 mg total) by nebulization every 6 (six) hours as needed for wheezing or shortness of breath   albuterol (PROVENTIL HFA,VENTOLIN HFA) 90 mcg/act inhaler   No No   Sig: Inhale 2 puffs every 6 (six) hours as needed for wheezing   budesonide-formoterol (Symbicort) 160-4.5 mcg/act inhaler   No No   Sig: Inhale 2 puffs 2 (two) times a day Rinse mouth after use.   Patient not taking:  Reported on 4/3/2024   ipratropium-albuterol (DUO-NEB) 0.5-2.5 mg/3 mL nebulizer solution   No No   Sig: Take 3 mL by nebulization every 6 (six) hours as needed for wheezing or shortness of breath   montelukast (SINGULAIR) 10 mg tablet   No No   Sig: Take 1 tablet (10 mg total) by mouth daily at bedtime      Facility-Administered Medications Last Administration Doses Remaining   ipratropium (ATROVENT) 0.02 % inhalation solution 0.5 mg 9/14/2021  7:29 PM         Discharge Medication List as of 6/16/2025 11:48 PM        START taking these medications    Details   amoxicillin-clavulanate (AUGMENTIN) 875-125 mg per tablet Take 1 tablet by mouth every 12 (twelve) hours for 5 days, Starting Mon 6/16/2025, Until Sat 6/21/2025, Normal           CONTINUE these medications which have NOT CHANGED    Details   albuterol (2.5 mg/3 mL) 0.083 % nebulizer solution Take 3 mL (2.5 mg total) by nebulization every 6 (six) hours as needed for wheezing or shortness of breath, Starting Fri 11/1/2024, Normal      albuterol (PROVENTIL HFA,VENTOLIN HFA) 90 mcg/act inhaler Inhale 2 puffs every 6 (six) hours as needed for wheezing, Starting Mon 5/12/2025, Normal      budesonide-formoterol (Symbicort) 160-4.5 mcg/act inhaler Inhale 2 puffs 2 (two) times a day Rinse mouth after use., Starting Tue 2/6/2024, Normal      ipratropium-albuterol (DUO-NEB) 0.5-2.5 mg/3 mL nebulizer solution Take 3 mL by nebulization every 6 (six) hours as needed for wheezing or shortness of breath, Starting Fri 11/1/2024, Normal      montelukast (SINGULAIR) 10 mg tablet Take 1 tablet (10 mg total) by mouth daily at bedtime, Starting Fri 11/1/2024, Normal             ED SEPSIS DOCUMENTATION   Time reflects when diagnosis was documented in both MDM as applicable and the Disposition within this note       Time User Action Codes Description Comment    6/16/2025 11:44 PM Mona Rodarte Add [K57.92] Diverticulitis     6/16/2025 11:46 PM Mona Rodarte Add [K92.1]  Hematochezia                    [1]   Past Medical History:  Diagnosis Date    Anxiety     Asthma    [2]   Past Surgical History:  Procedure Laterality Date    APPENDECTOMY      FACIAL RECONSTRUCTION SURGERY      HERNIA REPAIR     [3]   Family History  Adopted: Yes   [4]   Social History  Tobacco Use    Smoking status: Former     Passive exposure: Never    Smokeless tobacco: Never   Vaping Use    Vaping status: Never Used   Substance Use Topics    Alcohol use: Yes     Comment: occ    Drug use: Yes     Types: Marijuana        Mona Rodarte PA-C  06/17/25 0306

## 2025-06-17 NOTE — DISCHARGE INSTRUCTIONS
Please take your antibiotic 2 times a day for the next 5 days.  Placed a referral for you to follow-up with GI.  Can take Tylenol and Motrin as needed for abdominal pain.  If you develop symptoms of anemia such as chest pain, shortness of breath, dizziness, lightheadedness or passing out return to the ER.

## 2025-07-02 ENCOUNTER — TELEPHONE (OUTPATIENT)
Age: 35
End: 2025-07-02

## 2025-07-02 NOTE — TELEPHONE ENCOUNTER
Patients GI provider:  Dr. Gaxiola     Number to return call: [phone]    Reason for call: Pt is scheduled for an appt and does not have insurance. Patient is aware $30.00 is due at OV. An estimate has been generated. Patient is requesting for estimate to be mailed to home address listed on chart, thank you.    Scheduled procedure/appointment date if applicable: Apt/procedure [id number]

## 2025-08-11 ENCOUNTER — TELEPHONE (OUTPATIENT)
Dept: GASTROENTEROLOGY | Facility: CLINIC | Age: 35
End: 2025-08-11

## 2025-08-17 DIAGNOSIS — J45.901 ASTHMA EXACERBATION: ICD-10-CM

## 2025-08-19 RX ORDER — ALBUTEROL SULFATE 90 UG/1
2 INHALANT RESPIRATORY (INHALATION) EVERY 6 HOURS PRN
Qty: 18 G | Refills: 5 | Status: SHIPPED | OUTPATIENT
Start: 2025-08-19